# Patient Record
Sex: FEMALE | Race: WHITE | NOT HISPANIC OR LATINO | RURAL
[De-identification: names, ages, dates, MRNs, and addresses within clinical notes are randomized per-mention and may not be internally consistent; named-entity substitution may affect disease eponyms.]

---

## 2019-05-29 ENCOUNTER — OFFICE VISIT (OUTPATIENT)
Dept: RETAIL CLINIC | Facility: CLINIC | Age: 49
End: 2019-05-29

## 2019-05-29 VITALS
SYSTOLIC BLOOD PRESSURE: 120 MMHG | WEIGHT: 211 LBS | RESPIRATION RATE: 12 BRPM | TEMPERATURE: 98.4 F | OXYGEN SATURATION: 98 % | BODY MASS INDEX: 33.91 KG/M2 | HEIGHT: 66 IN | HEART RATE: 65 BPM | DIASTOLIC BLOOD PRESSURE: 82 MMHG

## 2019-05-29 DIAGNOSIS — J04.0 LARYNGITIS: Primary | ICD-10-CM

## 2019-05-29 DIAGNOSIS — J06.9 VIRAL UPPER RESPIRATORY TRACT INFECTION: ICD-10-CM

## 2019-05-29 PROCEDURE — 99203 OFFICE O/P NEW LOW 30 MIN: CPT | Performed by: NURSE PRACTITIONER

## 2019-05-29 RX ORDER — DEXTROMETHORPHAN HYDROBROMIDE AND PROMETHAZINE HYDROCHLORIDE 15; 6.25 MG/5ML; MG/5ML
5 SYRUP ORAL 4 TIMES DAILY PRN
Qty: 120 ML | Refills: 0 | Status: SHIPPED | OUTPATIENT
Start: 2019-05-29 | End: 2019-06-03

## 2019-05-29 RX ORDER — PREDNISONE 10 MG/1
TABLET ORAL
Qty: 21 TABLET | Refills: 0 | Status: SHIPPED | OUTPATIENT
Start: 2019-05-29 | End: 2019-12-01

## 2019-05-29 NOTE — PROGRESS NOTES
"Ingrid Shah is a 48 y.o. female.   /82   Pulse 65   Temp 98.4 °F (36.9 °C)   Resp 12   Ht 167.6 cm (66\")   Wt 95.7 kg (211 lb)   LMP 04/25/2019   SpO2 98%   BMI 34.06 kg/m²   No past medical history on file.  No Known Allergies      Sore Throat    This is a new problem. Episode onset: 2-3 days. The problem has been gradually worsening. There has been no fever. The pain is moderate. Associated symptoms include congestion, coughing and ear pain (pressure). She has had no exposure to strep.        The following portions of the patient's history were reviewed and updated as appropriate: allergies, current medications, past family history, past medical history, past social history, past surgical history and problem list.    Review of Systems   Constitutional: Positive for fatigue (mild). Negative for activity change and fever.   HENT: Positive for congestion, ear pain (pressure), postnasal drip, rhinorrhea and sore throat. Negative for sinus pain.    Eyes: Negative.    Respiratory: Positive for cough and wheezing.    Cardiovascular: Negative.    Gastrointestinal: Negative.        Objective   Physical Exam   Constitutional: She appears well-developed and well-nourished.  Non-toxic appearance. She appears ill (mild).   HENT:   Head: Normocephalic and atraumatic.   Right Ear: Tympanic membrane and ear canal normal.   Left Ear: Tympanic membrane and ear canal normal.   Nose: Mucosal edema and rhinorrhea present. Right sinus exhibits no maxillary sinus tenderness and no frontal sinus tenderness. Left sinus exhibits no maxillary sinus tenderness and no frontal sinus tenderness.   Mouth/Throat: Uvula is midline. Posterior oropharyngeal erythema (mild) present. No tonsillar exudate.   Cardiovascular: Regular rhythm and normal heart sounds.   Pulmonary/Chest: Effort normal. She has no wheezes. She has rhonchi (mild). She has no rales.   Lymphadenopathy:     She has no cervical adenopathy. "   Skin: Skin is warm and dry.       Assessment/Plan   Bety was seen today for sinusitis.    Diagnoses and all orders for this visit:    Laryngitis    Viral upper respiratory tract infection    Other orders  -     predniSONE (DELTASONE) 10 MG tablet; 6/5/4/3/2/1 As directed PO  -     albuterol (PROAIR RESPICLICK) 108 (90 Base) MCG/ACT inhaler; Inhale 2 puffs Every 4 (Four) Hours As Needed for Wheezing or Shortness of Air for up to 30 days.

## 2019-12-01 ENCOUNTER — OFFICE VISIT (OUTPATIENT)
Dept: RETAIL CLINIC | Facility: CLINIC | Age: 49
End: 2019-12-01

## 2019-12-01 VITALS
DIASTOLIC BLOOD PRESSURE: 78 MMHG | TEMPERATURE: 98.7 F | WEIGHT: 202 LBS | OXYGEN SATURATION: 97 % | HEIGHT: 60 IN | RESPIRATION RATE: 20 BRPM | BODY MASS INDEX: 39.66 KG/M2 | HEART RATE: 74 BPM | SYSTOLIC BLOOD PRESSURE: 126 MMHG

## 2019-12-01 DIAGNOSIS — R05.9 COUGHING: Primary | ICD-10-CM

## 2019-12-01 DIAGNOSIS — R09.81 SINUS CONGESTION: ICD-10-CM

## 2019-12-01 PROCEDURE — 99213 OFFICE O/P EST LOW 20 MIN: CPT | Performed by: NURSE PRACTITIONER

## 2019-12-01 RX ORDER — PSEUDOEPHEDRINE HCL 120 MG/1
120 TABLET, FILM COATED, EXTENDED RELEASE ORAL EVERY 12 HOURS
Qty: 20 TABLET | Refills: 0 | Status: SHIPPED | OUTPATIENT
Start: 2019-12-01 | End: 2019-12-11

## 2019-12-01 RX ORDER — DEXTROMETHORPHAN HYDROBROMIDE AND PROMETHAZINE HYDROCHLORIDE 15; 6.25 MG/5ML; MG/5ML
5 SYRUP ORAL 4 TIMES DAILY PRN
Qty: 240 ML | Refills: 0 | Status: SHIPPED | OUTPATIENT
Start: 2019-12-01 | End: 2019-12-11

## 2019-12-01 RX ORDER — ALBUTEROL SULFATE 90 UG/1
2 AEROSOL, METERED RESPIRATORY (INHALATION) EVERY 4 HOURS PRN
Refills: 0 | COMMUNITY
Start: 2019-11-30

## 2019-12-01 RX ORDER — PREDNISONE 10 MG/1
TABLET ORAL
Qty: 24 TABLET | Refills: 0 | Status: SHIPPED | OUTPATIENT
Start: 2019-12-01

## 2019-12-01 RX ORDER — BENZONATATE 200 MG/1
200 CAPSULE ORAL 3 TIMES DAILY PRN
Qty: 30 CAPSULE | Refills: 0 | Status: SHIPPED | OUTPATIENT
Start: 2019-12-01 | End: 2019-12-11

## 2019-12-01 NOTE — PROGRESS NOTES
"Subjective   Bety Shah is a 49 y.o. female.     URI    This is a new problem. The current episode started in the past 7 days. The problem has been gradually worsening. There has been no fever. Associated symptoms include congestion, coughing (severe, persistent, almost constant) and rhinorrhea. Pertinent negatives include no abdominal pain, chest pain, diarrhea, ear pain, headaches, joint pain, joint swelling, nausea, neck pain, plugged ear sensation, rash, sinus pain, sneezing, sore throat, swollen glands, vomiting or wheezing. Treatments tried: otc cough and cold medications  The treatment provided no relief.        The following portions of the patient's history were reviewed and updated as appropriate: allergies, current medications, past medical history, past social history, past surgical history and problem list.    Review of Systems   Constitutional: Positive for fatigue. Negative for appetite change, chills and fever.   HENT: Positive for congestion, postnasal drip and rhinorrhea. Negative for ear pain, sinus pressure, sinus pain, sneezing, sore throat and trouble swallowing.    Eyes: Negative.    Respiratory: Positive for cough (severe, persistent, almost constant) and chest tightness. Negative for shortness of breath and wheezing.    Cardiovascular: Negative.  Negative for chest pain.   Gastrointestinal: Negative for abdominal pain, diarrhea, nausea and vomiting.   Musculoskeletal: Negative for arthralgias, joint pain, myalgias and neck pain.   Skin: Negative.  Negative for rash.   Neurological: Negative.  Negative for headaches.   Hematological: Negative for adenopathy.        /78   Pulse 74   Temp 98.7 °F (37.1 °C)   Resp 20   Ht 152.4 cm (60\")   Wt 91.6 kg (202 lb)   LMP 11/19/2019   SpO2 97%   BMI 39.45 kg/m²      Objective   Physical Exam   Constitutional: She is oriented to person, place, and time. Vital signs are normal. She appears well-developed and well-nourished. No " distress.   HENT:   Head: Normocephalic.   Right Ear: Tympanic membrane, external ear and ear canal normal. No drainage, swelling or tenderness. Tympanic membrane is not erythematous and not bulging.   Left Ear: Tympanic membrane, external ear and ear canal normal. No drainage, swelling or tenderness. Tympanic membrane is not erythematous and not bulging.   Nose: Mucosal edema and rhinorrhea present. Right sinus exhibits no maxillary sinus tenderness and no frontal sinus tenderness. Left sinus exhibits no maxillary sinus tenderness and no frontal sinus tenderness.   Mouth/Throat: Uvula is midline, oropharynx is clear and moist and mucous membranes are normal. Tonsils are 0 on the right. Tonsils are 0 on the left. No tonsillar exudate.   Neck: Normal range of motion. Neck supple.   Cardiovascular: Normal rate, regular rhythm, S1 normal, S2 normal and normal heart sounds.   Pulmonary/Chest: Effort normal and breath sounds normal. No stridor. No respiratory distress. She has no decreased breath sounds. She has no wheezes. She has no rhonchi. She has no rales.   Severe, persistent paroxysmal cough noted during exam.   Abdominal: Soft. Normal appearance and bowel sounds are normal. She exhibits no distension. There is no tenderness. There is no rebound and no guarding.   Lymphadenopathy:        Head (right side): No tonsillar adenopathy present.        Head (left side): No tonsillar adenopathy present.     She has no cervical adenopathy.   Neurological: She is alert and oriented to person, place, and time.   Skin: Skin is warm and dry. No rash noted. She is not diaphoretic.   Psychiatric: She has a normal mood and affect. Her speech is normal and behavior is normal. Thought content normal.   Vitals reviewed.      Assessment/Plan   Bety was seen today for uri.    Diagnoses and all orders for this visit:    Coughing  -     benzonatate (TESSALON) 200 MG capsule; Take 1 capsule by mouth 3 (Three) Times a Day As Needed for  Cough for up to 10 days.  -     promethazine-dextromethorphan (PROMETHAZINE-DM) 6.25-15 MG/5ML syrup; Take 5 mL by mouth 4 (Four) Times a Day As Needed for Cough for up to 10 days.  -     predniSONE (DELTASONE) 10 MG tablet; 6 tabs po x 2 days; 4 tabs po x 2 days; 2 tabs po x 2 days; stop/ tapering dose x 6 days    Sinus congestion  -     pseudoephedrine (SUDAFED) 120 MG 12 hr tablet; Take 1 tablet by mouth Every 12 (Twelve) Hours for 10 days.

## 2022-05-17 NOTE — PATIENT INSTRUCTIONS
Laryngitis  Laryngitis is inflammation of your vocal cords. This causes hoarseness, coughing, loss of voice, sore throat, or a dry throat. Your vocal cords are two bands of muscles that are found in your throat. When you speak, these cords come together and vibrate. These vibrations come out through your mouth as sound. When your vocal cords are inflamed, your voice sounds different.  Laryngitis can be temporary (acute) or long-term (chronic). Most cases of acute laryngitis improve with time. Chronic laryngitis is laryngitis that lasts for more than three weeks.  What are the causes?  Acute laryngitis may be caused by:  · A viral infection.  · Lots of talking, yelling, or singing. This is also called vocal strain.  · Bacterial infections.    Chronic laryngitis may be caused by:  · Vocal strain.  · Injury to your vocal cords.  · Acid reflux (gastroesophageal reflux disease or GERD).  · Allergies.  · Sinus infection.  · Smoking.  · Alcohol abuse.  · Breathing in chemicals or dust.  · Growths on the vocal cords.    What increases the risk?  Risk factors for laryngitis include:  · Smoking.  · Alcohol abuse.  · Having allergies.    What are the signs or symptoms?  Symptoms of laryngitis may include:  · Low, hoarse voice.  · Loss of voice.  · Dry cough.  · Sore throat.  · Stuffy nose.    How is this diagnosed?  Laryngitis may be diagnosed by:  · Physical exam.  · Throat culture.  · Blood test.  · Laryngoscopy. This procedure allows your health care provider to look at your vocal cords with a mirror or viewing tube.    How is this treated?  Treatment for laryngitis depends on what is causing it. Usually, treatment involves resting your voice and using medicines to soothe your throat. However, if your laryngitis is caused by a bacterial infection, you may need to take antibiotic medicine. If your laryngitis is caused by a growth, you may need to have a procedure to remove it.  Follow these instructions at home:  · Drink  enough fluid to keep your urine clear or pale yellow.  · Breathe in moist air. Use a humidifier if you live in a dry climate.  · Take medicines only as directed by your health care provider.  · If you were prescribed an antibiotic medicine, finish it all even if you start to feel better.  · Do not smoke cigarettes or electronic cigarettes. If you need help quitting, ask your health care provider.  · Talk as little as possible. Also avoid whispering, which can cause vocal strain.  · Write instead of talking. Do this until your voice is back to normal.  Contact a health care provider if:  · You have a fever.  · You have increasing pain.  · You have difficulty swallowing.  Get help right away if:  · You cough up blood.  · You have trouble breathing.  This information is not intended to replace advice given to you by your health care provider. Make sure you discuss any questions you have with your health care provider.  Document Released: 12/18/2006 Document Revised: 05/25/2017 Document Reviewed: 06/02/2015  [employer] Interactive Patient Education © 2018 [employer]    Viral Respiratory Infection  A respiratory infection is an illness that affects part of the respiratory system, such as the lungs, nose, or throat. Most respiratory infections are caused by either viruses or bacteria. A respiratory infection that is caused by a virus is called a viral respiratory infection.  Common types of viral respiratory infections include:  · A cold.  · The flu (influenza).  · A respiratory syncytial virus (RSV) infection.    How do I know if I have a viral respiratory infection?  Most viral respiratory infections cause:  · A stuffy or runny nose.  · Yellow or green nasal discharge.  · A cough.  · Sneezing.  · Fatigue.  · Achy muscles.  · A sore throat.  · Sweating or chills.  · A fever.  · A headache.    How are viral respiratory infections treated?  If influenza is diagnosed early, it may be treated with an antiviral medicine that  shortens the length of time a person has symptoms. Symptoms of viral respiratory infections may be treated with over-the-counter and prescription medicines, such as:  · Expectorants. These make it easier to cough up mucus.  · Decongestant nasal sprays.    Health care providers do not prescribe antibiotic medicines for viral infections. This is because antibiotics are designed to kill bacteria. They have no effect on viruses.  How do I know if I should stay home from work or school?  To avoid exposing others to your respiratory infection, stay home if you have:  · A fever.  · A persistent cough.  · A sore throat.  · A runny nose.  · Sneezing.  · Muscles aches.  · Headaches.  · Fatigue.  · Weakness.  · Chills.  · Sweating.  · Nausea.    Follow these instructions at home:  · Rest as much as possible.  · Take over-the-counter and prescription medicines only as told by your health care provider.  · Drink enough fluid to keep your urine clear or pale yellow. This helps prevent dehydration and helps loosen up mucus.  · Gargle with a salt-water mixture 3-4 times per day or as needed. To make a salt-water mixture, completely dissolve ½-1 tsp of salt in 1 cup of warm water.  · Use nose drops made from salt water to ease congestion and soften raw skin around your nose.  · Do not drink alcohol.  · Do not use tobacco products, including cigarettes, chewing tobacco, and e-cigarettes. If you need help quitting, ask your health care provider.  Contact a health care provider if:  · Your symptoms last for 10 days or longer.  · Your symptoms get worse over time.  · You have a fever.  · You have severe sinus pain in your face or forehead.  · The glands in your jaw or neck become very swollen.  Get help right away if:  · You feel pain or pressure in your chest.  · You have shortness of breath.  · You faint or feel like you will faint.  · You have severe and persistent vomiting.  · You feel confused or disoriented.  This information is  not intended to replace advice given to you by your health care provider. Make sure you discuss any questions you have with your health care provider.  Document Released: 09/27/2006 Document Revised: 05/25/2017 Document Reviewed: 05/25/2016  [employer] Interactive Patient Education © 2019 [employer]     Abdomen soft, non-tender, no guarding.

## 2023-07-14 PROBLEM — E66.01 MORBID (SEVERE) OBESITY DUE TO EXCESS CALORIES: Status: ACTIVE | Noted: 2023-07-14

## 2023-07-14 PROBLEM — M17.11 OSTEOARTHRITIS OF RIGHT KNEE: Status: ACTIVE | Noted: 2023-07-14

## 2023-07-14 PROBLEM — I10 PRIMARY HYPERTENSION: Status: ACTIVE | Noted: 2023-07-14

## 2023-07-14 PROBLEM — I47.1 PAROXYSMAL SVT (SUPRAVENTRICULAR TACHYCARDIA): Status: ACTIVE | Noted: 2023-07-14

## 2023-07-14 PROBLEM — R11.2 NAUSEA AND VOMITING: Status: ACTIVE | Noted: 2023-07-14

## 2023-07-14 PROBLEM — R42 DIZZINESS: Status: ACTIVE | Noted: 2023-07-14

## 2023-07-14 PROBLEM — R42 VERTIGO: Status: ACTIVE | Noted: 2023-07-14

## 2023-07-14 PROBLEM — I47.10 PAROXYSMAL SVT (SUPRAVENTRICULAR TACHYCARDIA): Status: ACTIVE | Noted: 2023-07-14

## 2023-09-13 ENCOUNTER — OFFICE VISIT (OUTPATIENT)
Dept: CARDIOLOGY | Facility: CLINIC | Age: 53
End: 2023-09-13
Payer: COMMERCIAL

## 2023-09-13 VITALS
WEIGHT: 228 LBS | OXYGEN SATURATION: 95 % | HEIGHT: 60 IN | BODY MASS INDEX: 44.76 KG/M2 | DIASTOLIC BLOOD PRESSURE: 80 MMHG | SYSTOLIC BLOOD PRESSURE: 110 MMHG | HEART RATE: 61 BPM

## 2023-09-13 DIAGNOSIS — I10 PRIMARY HYPERTENSION: ICD-10-CM

## 2023-09-13 DIAGNOSIS — R06.83 SNORING: ICD-10-CM

## 2023-09-13 DIAGNOSIS — G47.19 EXCESSIVE DAYTIME SLEEPINESS: ICD-10-CM

## 2023-09-13 DIAGNOSIS — I47.1 PAROXYSMAL SVT (SUPRAVENTRICULAR TACHYCARDIA): Primary | ICD-10-CM

## 2023-09-13 RX ORDER — METOPROLOL TARTRATE 50 MG/1
50 TABLET, FILM COATED ORAL 2 TIMES DAILY
Qty: 180 TABLET | Refills: 3 | Status: SHIPPED | OUTPATIENT
Start: 2023-09-13

## 2023-09-13 NOTE — PROGRESS NOTES
[facility] Provider Note     Date:   2023   Name: Bety Shah  :   1970  PCP: José Miguel Henry MD    Chief Complaint   Patient presents with   • Follow-up     1 year SVT       Subjective     History of Present Illness  Bety Shah is a 53 y.o. female who presents today for 1 year follow-up on SVT.  Patient has a previous history of SVT, currently controlled with metoprolol tartrate.  Patient denies recent episodes of SVT or frequent palpitations.  She also denies chest pain, shortness of breath, dizziness, lower extremity edema or syncope.  Overall, patient has been doing well with no recent SVT events.  Patient suspects that she may have sleep apnea. She reports very loud snoring and excessive daytime sleepiness and fatigue.  A sleep study was previously recommended but patient was unable to afford it at that time.  We discussed a home sleep study and patient is willing to proceed for further evaluation and management.    Cardiac history  1.  SVT  2.  Hypertension    [facility] 2020-normal coronary arteries.    Echocardiogram 12/10/2019-LVEF 55-60%.  Mild mitral regurgitation.  Diastolic function is abnormal.  Mild tricuspid regurgitation.  RVSP is normal.     Reports Denies   Chest Pain [] [x]   Shortness of Air [] [x]   Palpitations [] [x]   Edema [] [x]   Dizziness [] [x]   Syncope [] [x]       No Known Allergies    Current Outpatient Medications:   •  albuterol sulfate  (90 Base) MCG/ACT inhaler, Inhale 2 puffs Every 4 (Four) Hours As Needed., Disp: , Rfl: 0  •  diclofenac (VOLTAREN) 75 MG EC tablet, , Disp: , Rfl:   •  Diclofenac Sodium (VOLTAREN) 1 % gel gel, APPLY 4 GRAMS OF GEL TOPICALLY TO AFFECTED AREA UP TO 4 TIMES DAILY, Disp: , Rfl:   •  meclizine (ANTIVERT) 25 MG tablet, Take 1 tablet by mouth 3 (Three) Times a Day As Needed for Dizziness or Nausea., Disp: 30 tablet, Rfl: 0  •  metoprolol tartrate (LOPRESSOR) 50 MG tablet, Take 1 tablet by  "mouth 2 (Two) Times a Day., Disp: 180 tablet, Rfl: 3  •  ondansetron (ZOFRAN) 8 MG tablet, Take 1 tablet by mouth Every 8 (Eight) Hours As Needed for Nausea or Vomiting., Disp: 12 tablet, Rfl: 0    Past Medical History:   Diagnosis Date   • Osteoarthritis of right knee 7/14/2023   • Primary hypertension 7/14/2023      History reviewed. No pertinent surgical history.  Family History   Problem Relation Age of Onset   • Cancer Mother    • Heart disease Father      Social History     Socioeconomic History   • Marital status: Single   Tobacco Use   • Smoking status: Never     Passive exposure: Never   • Smokeless tobacco: Never   Vaping Use   • Vaping Use: Never used   Substance and Sexual Activity   • Alcohol use: Yes     Comment: social   • Drug use: Never   • Sexual activity: Yes     Partners: Male       Objective     Vital Signs:  /80 (BP Location: Left arm)   Pulse 61   Ht 152.4 cm (60\")   Wt 103 kg (228 lb)   SpO2 95%   BMI 44.53 kg/m²   Estimated body mass index is 44.53 kg/m² as calculated from the following:    Height as of this encounter: 152.4 cm (60\").    Weight as of this encounter: 103 kg (228 lb).       Class 3 Severe Obesity (BMI >=40). Obesity-related health conditions include the following: hypertension. Obesity is unchanged. BMI is is above average; BMI management plan is completed. We discussed portion control and increasing exercise.      Physical Exam  Vitals reviewed.   Constitutional:       Appearance: Normal appearance.   HENT:      Head: Normocephalic.   Cardiovascular:      Rate and Rhythm: Normal rate and regular rhythm.      Heart sounds: Normal heart sounds.   Pulmonary:      Effort: Pulmonary effort is normal.      Breath sounds: Normal breath sounds.   Musculoskeletal:      Right lower leg: No edema.      Left lower leg: No edema.   Skin:     General: Skin is warm and dry.      Capillary Refill: Capillary refill takes less than 2 seconds.   Neurological:      General: No focal " deficit present.      Mental Status: She is alert and oriented to person, place, and time.   Psychiatric:         Mood and Affect: Mood normal.         Behavior: Behavior normal.                   Assessment and Plan     Diagnoses and all orders for this visit:    1. Paroxysmal SVT (supraventricular tachycardia) (Primary)  Assessment & Plan:  No recent episodes.  - Continue metoprolol tartrate at current dose.      2. Excessive daytime sleepiness  Assessment & Plan:  Home sleep study for further evaluation and management.    Orders:  -     Home Sleep Study; Future    3. Primary hypertension  Assessment & Plan:  Hypertension is  well controlled. .  Continue current treatment regimen.  Regular aerobic exercise.  Blood pressure will be reassessed at the next regular appointment.      4. Snoring  -     Home Sleep Study; Future    Other orders  -     metoprolol tartrate (LOPRESSOR) 50 MG tablet; Take 1 tablet by mouth 2 (Two) Times a Day.  Dispense: 180 tablet; Refill: 3        Recommendations: Report if any new/changing symptoms immediately and Limit caffeine          Follow Up  Return in about 4 weeks (around 10/11/2023) for Sleep Study Results.  Patient was given instructions and counseling regarding her condition or for health maintenance advice. Please see specific information pulled into the AVS if appropriate.

## 2023-09-13 NOTE — ASSESSMENT & PLAN NOTE
Hypertension is  well controlled. .  Continue current treatment regimen.  Regular aerobic exercise.  Blood pressure will be reassessed at the next regular appointment.

## 2023-09-28 ENCOUNTER — TELEPHONE (OUTPATIENT)
Dept: CARDIOLOGY | Facility: CLINIC | Age: 53
End: 2023-09-28
Payer: COMMERCIAL

## 2023-09-28 NOTE — TELEPHONE ENCOUNTER
Patient does have ERIC with [facility], Dr. Chirinos has let us know. She also has a low heart rate at night. If she has any dizziness during the day we should order a holter. Please let me know if we need to and also what DME for CPAP supplies.

## 2023-09-28 NOTE — TELEPHONE ENCOUNTER
Spoke to patient, patient understood. She stated she wore a heart monitor 2 years ago and they told her everything was fine, she stated she would discuss a holter at next follow up. She would like her CPAP order to be sent to [facility].

## 2023-09-29 DIAGNOSIS — R06.83 SNORING: ICD-10-CM

## 2023-09-29 DIAGNOSIS — G47.33 OSA (OBSTRUCTIVE SLEEP APNEA): Primary | ICD-10-CM

## 2023-09-29 DIAGNOSIS — G47.19 EXCESSIVE DAYTIME SLEEPINESS: ICD-10-CM

## 2023-10-03 DIAGNOSIS — G47.33 OSA (OBSTRUCTIVE SLEEP APNEA): Primary | ICD-10-CM

## 2023-11-06 ENCOUNTER — OFFICE VISIT (OUTPATIENT)
Dept: CARDIOLOGY | Facility: CLINIC | Age: 53
End: 2023-11-06
Payer: COMMERCIAL

## 2023-11-06 VITALS
OXYGEN SATURATION: 98 % | DIASTOLIC BLOOD PRESSURE: 74 MMHG | SYSTOLIC BLOOD PRESSURE: 132 MMHG | HEART RATE: 63 BPM | HEIGHT: 60 IN | BODY MASS INDEX: 44.96 KG/M2 | WEIGHT: 229 LBS

## 2023-11-06 DIAGNOSIS — I10 PRIMARY HYPERTENSION: ICD-10-CM

## 2023-11-06 DIAGNOSIS — G47.33 OSA (OBSTRUCTIVE SLEEP APNEA): ICD-10-CM

## 2023-11-06 DIAGNOSIS — I47.10 PAROXYSMAL SVT (SUPRAVENTRICULAR TACHYCARDIA): Primary | ICD-10-CM

## 2023-11-06 PROCEDURE — 99214 OFFICE O/P EST MOD 30 MIN: CPT | Performed by: NURSE PRACTITIONER

## 2023-11-06 NOTE — PROGRESS NOTES
[facility] Provider Note     Date:   2023   Name: Bety Shah  :   1970  PCP: José Miguel Henry MD    Chief Complaint   Patient presents with    Rapid Heart Rate       Subjective     History of Present Illness  Bety Shah is a 53 y.o. female who presents today for follow-up on SVT and ERIC.  Patient completed a home sleep study on 2023 that revealed moderate ERIC with overall AHI of 24.  Supine AHI was 26.  Her oxygen saturation was below 89% for 25 minutes with oxygen desaturation to a minimum of 72%.  She did have 1 episode of profound bradycardia during sleep.  PAP therapy was ordered but patient has not started it yet.  She went to the DME company and tried on a mask and was unable to tolerate the pressure.  She decided not to try PAP therapy at that time due to fear that she would not be able to tolerate it and have to pay for the device.  I stressed the importance of treating REIC.  Encouraged her to start PAP therapy due to moderate ERIC and desaturations on home sleep study.  She has not had any recent episodes of SVT, currently controlled with metoprolol.  She denies any new concerns or complaints today.    Cardiac history  1.  SVT  2.  Hypertension  3.  ERIC with baseline AHI of 24 on HST from 2023    [facility] 2020-normal coronary arteries.    Echocardiogram 12/10/2019-LVEF 55-60%.  Mild mitral regurgitation.  Diastolic function is abnormal.  Mild tricuspid regurgitation.  RVSP is normal.    Home sleep study 2023-moderate ERIC with overall AHI of 24.      No Known Allergies    Current Outpatient Medications:     albuterol sulfate  (90 Base) MCG/ACT inhaler, Inhale 2 puffs Every 4 (Four) Hours As Needed., Disp: , Rfl: 0    diclofenac (VOLTAREN) 75 MG EC tablet, , Disp: , Rfl:     Diclofenac Sodium (VOLTAREN) 1 % gel gel, APPLY 4 GRAMS OF GEL TOPICALLY TO AFFECTED AREA UP TO 4 TIMES DAILY, Disp: , Rfl:     meclizine (ANTIVERT) 25 MG tablet,  "Take 1 tablet by mouth 3 (Three) Times a Day As Needed for Dizziness or Nausea., Disp: 30 tablet, Rfl: 0    metoprolol tartrate (LOPRESSOR) 50 MG tablet, Take 1 tablet by mouth 2 (Two) Times a Day., Disp: 180 tablet, Rfl: 3    ondansetron (ZOFRAN) 8 MG tablet, Take 1 tablet by mouth Every 8 (Eight) Hours As Needed for Nausea or Vomiting., Disp: 12 tablet, Rfl: 0    Past Medical History:   Diagnosis Date    Osteoarthritis of right knee 7/14/2023    Primary hypertension 7/14/2023      History reviewed. No pertinent surgical history.  Family History   Problem Relation Age of Onset    Cancer Mother     Heart disease Father      Social History     Socioeconomic History    Marital status: Single   Tobacco Use    Smoking status: Never     Passive exposure: Never    Smokeless tobacco: Never   Vaping Use    Vaping Use: Never used   Substance and Sexual Activity    Alcohol use: Yes     Comment: social    Drug use: Never    Sexual activity: Yes     Partners: Male       Objective     Vital Signs:  /74   Pulse 63   Ht 152.4 cm (60\")   Wt 104 kg (229 lb)   SpO2 98%   BMI 44.72 kg/m²   Estimated body mass index is 44.72 kg/m² as calculated from the following:    Height as of this encounter: 152.4 cm (60\").    Weight as of this encounter: 104 kg (229 lb).               Physical Exam  Vitals reviewed.   Constitutional:       Appearance: Normal appearance.   HENT:      Head: Normocephalic.   Cardiovascular:      Rate and Rhythm: Normal rate and regular rhythm.      Heart sounds: Normal heart sounds.   Pulmonary:      Effort: Pulmonary effort is normal.      Breath sounds: Normal breath sounds.   Musculoskeletal:      Right lower leg: No edema.      Left lower leg: No edema.   Skin:     General: Skin is warm and dry.      Capillary Refill: Capillary refill takes less than 2 seconds.   Neurological:      General: No focal deficit present.      Mental Status: She is alert and oriented to person, place, and time. "   Psychiatric:         Mood and Affect: Mood normal.         Behavior: Behavior normal.           Sleep study reviewed          Assessment and Plan     Diagnoses and all orders for this visit:    1. Paroxysmal SVT (supraventricular tachycardia) (Primary)  Assessment & Plan:  No recent episodes.  - Continue metoprolol tartrate at current dose.      2. ERIC (obstructive sleep apnea)  Assessment & Plan:  Patient completed a home sleep study on 9/26/2023 that revealed moderate ERIC with overall AHI of 24.  Supine AHI was 26.  Her oxygen saturation was below 89% for 25 minutes with oxygen desaturation to a minimum of 72%.  She did have 1 episode of profound bradycardia during sleep.  PAP therapy was ordered but patient has not started it yet.  She went to the DME company and tried on a mask and was unable to tolerate the pressure.  She decided not to try PAP therapy at that time due to fear that she would not be able to tolerate it and have to pay for the device.    - Stressed the importance of treating ERIC and encouraged her to try PAP therapy.  We contacted DME company who will reach out to her and set up an appointment.  - Follow-up in 2 months for an ERIC compliance visit.      3. Primary hypertension  Assessment & Plan:  Hypertension is  well controlled. .  Continue current treatment regimen.  Regular aerobic exercise.  Blood pressure will be reassessed at the next regular appointment.          Recommendations: Report if any new/changing symptoms immediately, Sleep risks reviewed (driving, medical, sleep death, sedating agents), and Increase pap therapy usage          Follow Up  Return in about 2 months (around 1/6/2024) for ERIC 31-90 day compliance visit. .  Patient was given instructions and counseling regarding her condition or for health maintenance advice. Please see specific information pulled into the AVS if appropriate.

## 2024-04-22 ENCOUNTER — TELEPHONE (OUTPATIENT)
Dept: FAMILY MEDICINE CLINIC | Facility: CLINIC | Age: 54
End: 2024-04-22
Payer: COMMERCIAL

## 2024-08-23 ENCOUNTER — TELEPHONE (OUTPATIENT)
Dept: FAMILY MEDICINE CLINIC | Facility: CLINIC | Age: 54
End: 2024-08-23
Payer: COMMERCIAL

## 2024-08-23 DIAGNOSIS — R92.8 ABNORMALITY OF LEFT BREAST ON SCREENING MAMMOGRAM: Primary | ICD-10-CM

## 2024-08-23 NOTE — TELEPHONE ENCOUNTER
Phone conversation with patient regarding results of testing from day, 8/23/2024 as follows:    Bilateral screening mammogram as scheduled by patient herself reveals no concerning findings in the right breast, though on the left breast at the 2:00 middle depth there are areas of grouped calcifications with recommendation for further evaluation with magnification views.    Assessment/plan:  1.  Abnormal left breast ultrasound is noted.  Schedule diagnostic left-sided mammogram.  2.  Patient does mention to me that she has had some soreness on the anterior aspect of the left axilla.  Will schedule her for a follow-up visit including simultaneous complete physical on 8/27/2024 at 1:30 PM.

## 2024-08-27 ENCOUNTER — OFFICE VISIT (OUTPATIENT)
Dept: FAMILY MEDICINE CLINIC | Facility: CLINIC | Age: 54
End: 2024-08-27
Payer: COMMERCIAL

## 2024-08-27 VITALS
RESPIRATION RATE: 18 BRPM | TEMPERATURE: 97.8 F | HEIGHT: 60 IN | DIASTOLIC BLOOD PRESSURE: 81 MMHG | HEART RATE: 66 BPM | BODY MASS INDEX: 45.39 KG/M2 | SYSTOLIC BLOOD PRESSURE: 139 MMHG | WEIGHT: 231.2 LBS | OXYGEN SATURATION: 97 %

## 2024-08-27 DIAGNOSIS — I10 PRIMARY HYPERTENSION: ICD-10-CM

## 2024-08-27 DIAGNOSIS — Z01.818 PREOP EXAMINATION: ICD-10-CM

## 2024-08-27 DIAGNOSIS — Z12.4 SCREENING FOR CERVICAL CANCER: ICD-10-CM

## 2024-08-27 DIAGNOSIS — Z80.0 FAMILY HISTORY OF COLON CANCER IN MOTHER: ICD-10-CM

## 2024-08-27 DIAGNOSIS — L73.2 AXILLARY HIDRADENITIS SUPPURATIVA: ICD-10-CM

## 2024-08-27 DIAGNOSIS — E78.5 DYSLIPIDEMIA: ICD-10-CM

## 2024-08-27 DIAGNOSIS — Z13.1 SCREENING FOR DIABETES MELLITUS: ICD-10-CM

## 2024-08-27 DIAGNOSIS — K21.9 GASTROESOPHAGEAL REFLUX DISEASE, UNSPECIFIED WHETHER ESOPHAGITIS PRESENT: ICD-10-CM

## 2024-08-27 DIAGNOSIS — Z13.29 SCREENING FOR THYROID DISORDER: ICD-10-CM

## 2024-08-27 DIAGNOSIS — I47.10 PAROXYSMAL SVT (SUPRAVENTRICULAR TACHYCARDIA): ICD-10-CM

## 2024-08-27 DIAGNOSIS — E55.9 VITAMIN D DEFICIENCY: ICD-10-CM

## 2024-08-27 DIAGNOSIS — Z00.01 ENCOUNTER FOR GENERAL ADULT MEDICAL EXAMINATION WITH ABNORMAL FINDINGS: Primary | ICD-10-CM

## 2024-08-27 DIAGNOSIS — G47.33 OSA (OBSTRUCTIVE SLEEP APNEA): ICD-10-CM

## 2024-08-27 DIAGNOSIS — E66.01 MORBID (SEVERE) OBESITY DUE TO EXCESS CALORIES: ICD-10-CM

## 2024-08-27 DIAGNOSIS — Z12.11 SCREEN FOR COLON CANCER: ICD-10-CM

## 2024-08-27 DIAGNOSIS — M17.11 PRIMARY OSTEOARTHRITIS OF RIGHT KNEE: ICD-10-CM

## 2024-08-27 DIAGNOSIS — Z11.59 NEED FOR HEPATITIS C SCREENING TEST: ICD-10-CM

## 2024-08-27 PROCEDURE — 93000 ELECTROCARDIOGRAM COMPLETE: CPT | Performed by: INTERNAL MEDICINE

## 2024-08-27 PROCEDURE — 99396 PREV VISIT EST AGE 40-64: CPT | Performed by: INTERNAL MEDICINE

## 2024-08-27 PROCEDURE — 99214 OFFICE O/P EST MOD 30 MIN: CPT | Performed by: INTERNAL MEDICINE

## 2024-08-27 RX ORDER — DOXYCYCLINE 100 MG/1
100 CAPSULE ORAL 2 TIMES DAILY
Qty: 30 CAPSULE | Refills: 0 | Status: SHIPPED | OUTPATIENT
Start: 2024-08-27 | End: 2024-08-27

## 2024-08-27 RX ORDER — PANTOPRAZOLE SODIUM 40 MG/1
40 TABLET, DELAYED RELEASE ORAL DAILY
Qty: 90 TABLET | Refills: 3 | Status: SHIPPED | OUTPATIENT
Start: 2024-08-27

## 2024-08-27 RX ORDER — PHENTERMINE HYDROCHLORIDE 15 MG/1
15 CAPSULE ORAL EVERY MORNING
Qty: 30 CAPSULE | Refills: 0 | Status: SHIPPED | OUTPATIENT
Start: 2024-08-27

## 2024-08-27 RX ORDER — DOXYCYCLINE 100 MG/1
100 CAPSULE ORAL 2 TIMES DAILY
Qty: 30 CAPSULE | Refills: 0 | Status: SHIPPED | OUTPATIENT
Start: 2024-08-27

## 2024-08-27 NOTE — ASSESSMENT & PLAN NOTE
54-year-old female presenting for complete physical with health issues being addressed as detailed below, EKG today stable, referring for update of Pap smear, mammogram bilateral screening on 8/23/2024 with abnormality on the left with spot compression views pending, recommended update Shingrix COVID-19 and flu vaccines outside the office, update pertinent screening labs.

## 2024-08-27 NOTE — ASSESSMENT & PLAN NOTE
Currently not on any cholesterol-lowering medication.  Update lipid profile make further recommendation accordingly.

## 2024-08-27 NOTE — ASSESSMENT & PLAN NOTE
Patient to undergo colonoscopy with conscious sedation, this appointment to be scheduled.  EKG today unremarkable, obtain pertinent screening labs.

## 2024-08-27 NOTE — ASSESSMENT & PLAN NOTE
Discontinue prescribed Voltaren tablets given dyspepsia and GERD.  Use topical Voltaren gel and/or Tylenol.

## 2024-08-27 NOTE — ASSESSMENT & PLAN NOTE
Longstanding problem, recently has made some concerted efforts regarding lifestyle change including diet and exercise.  We discussed this needs to be more of a long-term gym effort, discussing recommendation to pursue the Mediterranean diet.  Unfortunately it appears that GLP-1 agonist, which she would be an optimal candidate, is not covered or cost effective by her insurance at this time.  Discussing treatment options, will initiate trial of phentermine 15 mg daily monitoring for side effects, especially in conjunction with history of hypertension and prior PSVT.  Advised of any related concerns.  Reassess clinically in 1 month.

## 2024-08-27 NOTE — PROGRESS NOTES
Female Physical Note      Date: 2024   Patient Name: Bety Shah  : 1970   MRN: [id number]     Chief Complaint:    Chief Complaint   Patient presents with    Annual Exam       History of Present Illness: Bety Shah is a 54 y.o. female who is here today for their annual health maintenance and physical.  Patient's main concern relates to a fullness with some tenderness recently in both axillary regions anteriorly.  She did have an abnormal left-sided mammogram recently with microcalcifications in the upper outer quadrant with recommended spot compression views to be obtained, patient wondering if this area could be the cause of her symptoms.  Occasionally has a stiff neck.  History of PSVT last noted 2023, previous left heart cath normal in 2020, echocardiogram in 10/2019 with EF 55 to 60%, mild MR mild TR, currently maintained on metoprolol 50 mg twice daily for both arrhythmia suppression of hypertension, doing well, blood pressures averaging 120-130 over 80s, followed by cardiology most recently in 2023 at the time with a 2-month follow-up recommended not yet pursued.  History of ERIC with AHI 24, intolerant of initial trial of CPAP, not yet trying any further hardware.  Complains of bilateral knee osteoarthritis, more so on the right, getting good control with Voltaren orally, rarely using topical Voltaren, also obesity having made some dietary changes with decreasing fast food and calorie restriction with increased fruit and vegetable intake, exercising 45 minutes twice weekly over the last month, frustrated with lack of benefit.  Patient also complains of water 2 times daily dyspepsia with heartburn taking Tums.  Review of systems otherwise unremarkable, no chest pains palpitations dizziness or edema, no shortness of breath cough or wheeze, dyspepsia otherwise no GI concerns, no  concerns, stiff neck and bilateral knee OA otherwise no related concerns.  Moods are  doing well.  Health maintenance includes need for initial colon cancer screening with family history of colon cancer in her mother, past due for Pap smear requesting gynecology referral, recent mammogram screen from 8/23/2024 with abnormal left-sided mammogram with spot compression views ordered and pending, recommended update Shingrix COVID-19 and flu vaccines, also update screening labs, EKG today unremarkable      Subjective      Review of Systems:   Review of Systems    Past Medical History, Social History, Family History and Care Team were all reviewed with patient and updated as appropriate.     Medications:     Current Outpatient Medications:     albuterol sulfate  (90 Base) MCG/ACT inhaler, Inhale 2 puffs Every 4 (Four) Hours As Needed., Disp: , Rfl: 0    diclofenac (VOLTAREN) 75 MG EC tablet, , Disp: , Rfl:     Diclofenac Sodium (VOLTAREN) 1 % gel gel, APPLY 4 GRAMS OF GEL TOPICALLY TO AFFECTED AREA UP TO 4 TIMES DAILY, Disp: , Rfl:     doxycycline (VIBRAMYCIN) 100 MG capsule, Take 1 capsule by mouth 2 (Two) Times a Day., Disp: 30 capsule, Rfl: 0    meclizine (ANTIVERT) 25 MG tablet, Take 1 tablet by mouth 3 (Three) Times a Day As Needed for Dizziness or Nausea., Disp: 30 tablet, Rfl: 0    metoprolol tartrate (LOPRESSOR) 50 MG tablet, Take 1 tablet by mouth 2 (Two) Times a Day., Disp: 180 tablet, Rfl: 3    ondansetron (ZOFRAN) 8 MG tablet, Take 1 tablet by mouth Every 8 (Eight) Hours As Needed for Nausea or Vomiting., Disp: 12 tablet, Rfl: 0    pantoprazole (Protonix) 40 MG EC tablet, Take 1 tablet by mouth Daily., Disp: 90 tablet, Rfl: 3    phentermine 15 MG capsule, Take 1 capsule by mouth Every Morning., Disp: 30 capsule, Rfl: 0    Allergies:   No Known Allergies    Immunizations:  Health Maintenance Summary            Overdue - COLORECTAL CANCER SCREENING (View Topic Details) Order placed this encounter      No completion, postpone, or frequency change history exists for this topic.               Ordered - HEPATITIS C SCREENING (Once) Ordered on 8/27/2024      No completion, postpone, or frequency change history exists for this topic.              Overdue - PAP SMEAR (Every 3 Years) Never done      No completion, postpone, or frequency change history exists for this topic.              Overdue - ZOSTER VACCINE (1 of 2) Never done      No completion, postpone, or frequency change history exists for this topic.              INFLUENZA VACCINE (Yearly - August to March) Due since 8/1/2024      10/25/2017  Imm Admin: Fluzone (or Fluarix & Flulaval for VFC) >6mos    08/27/2016  Imm Admin: Fluzone  >6mos              Postponed - COVID-19 Vaccine (1 - 2023-24 season) Postponed until 12/31/2024 08/27/2024  Postponed until 12/31/2024 by Jonna Franks (Patient Refused - refused)              ANNUAL PHYSICAL (Yearly) Next due on 8/27/2025 08/27/2024  Done              BMI FOLLOWUP (Yearly) Next due on 8/27/2025 08/27/2024  Registry Metric: BMI Follow-up    09/13/2023  Smart[employer]: WORKFLOW - QUALITY MEASUREMENT - BMI FOLLOW UP CARE PLAN DOCUMENTED    09/13/2023  Smart[employer]: WORKFLOW - QUALITY MEASUREMENT - DOCUMENTED WEIGHT FOLLOW-UP PLAN    07/14/2023  Smart[employer]: BMI EDUCATION FOR OVERWEIGHT              TDAP/TD VACCINES (2 - Td or Tdap) Next due on 4/22/2026 04/22/2016  Imm Admin: Tdap              Ordered - MAMMOGRAM (Every 2 Years) Ordered on 8/23/2024 08/23/2024  MAMMO Scan              Pneumococcal Vaccine 0-64 (Series Information) Aged Out      No completion, postpone, or frequency change history exists for this topic.                     No orders of the defined types were placed in this encounter.       Colorectal Screening:   Referral pending for colonoscopy  Last Completed Colonoscopy       This patient has no relevant Health Maintenance data.          Pap: Gynecology referral pending  Last Completed Pap Smear       This patient has no relevant Health Maintenance data.          "  Mammogram: Abnormal left mammogram 8/23/2024 referral pending for spot compression views, right mammogram screen normal  Last Completed Mammogram            Ordered - MAMMOGRAM (Every 2 Years) Ordered on 8/23/2024 08/23/2024  MAMMO Scan                     CT for Smoker (Age 50-80, 20 pk yr):   N/A  Bone Density/DEXA (Age 65 or high risk): N/A  Hep C (Age 18-79 once): Pending  HIV (Age 15-65 once): No results found for: \"HIV1X2\" N/A  A1c: No results found for: \"HGBA1C\" pending  Lipid panel:  No results found for: \"LIPIDEXCLUSI\" pending    The ASCVD Risk score (Inge YU, et al., 2019) failed to calculate for the following reasons:    Cannot find a previous HDL lab    Cannot find a previous total cholesterol lab    Dermatology: N/A  Ophthalmologist: Regular checkups encouraged  Dentist: Regular checkups encouraged    Tobacco Use: Low Risk  (8/27/2024)    Patient History     Smoking Tobacco Use: Never     Smokeless Tobacco Use: Never     Passive Exposure: Never       Social History     Substance and Sexual Activity   Alcohol Use Yes    Comment: social        Social History     Substance and Sexual Activity   Drug Use Never        Diet/Physical activity: Chronically suboptimal diet and physical activity, acutely has made some improvements    Sexual Health: Does not require contraception, not attempting pregnancy   Menopause: Yes  Menstrual Cycles: None, last menstrual cycle: Several years prior    Depression: PHQ-2 Depression Screening  PHQ-9 Total Score: 0       Objective     Physical Exam:  Vital Signs:   Vitals:    08/27/24 1326   BP: 139/81   BP Location: Left arm   Patient Position: Sitting   Cuff Size: Adult   Pulse: 66   Resp: 18   Temp: 97.8 °F (36.6 °C)   TempSrc: Temporal   SpO2: 97%   Weight: 105 kg (231 lb 3.2 oz)   Height: 152.4 cm (60\")     Facility age limit for growth %laila is 20 years.  Body mass index is 45.15 kg/m².     Physical Exam  Vitals and nursing note reviewed.   Constitutional:       " General: She is not in acute distress.     Appearance: Normal appearance. She is obese. She is not ill-appearing.      Comments: Pleasant healthy alert and oriented, NAD, BMI 45.1   HENT:      Head: Normocephalic and atraumatic.      Right Ear: Tympanic membrane, ear canal and external ear normal.      Left Ear: Tympanic membrane, ear canal and external ear normal.      Nose: Nose normal. No congestion or rhinorrhea.      Mouth/Throat:      Mouth: Mucous membranes are moist.      Pharynx: Oropharynx is clear.      Comments: Good dentition  Eyes:      Extraocular Movements: Extraocular movements intact.      Conjunctiva/sclera: Conjunctivae normal.      Pupils: Pupils are equal, round, and reactive to light.   Neck:      Vascular: No carotid bruit.      Comments: No periclavicular or axillary or inguinal adenopathy  Cardiovascular:      Rate and Rhythm: Normal rate and regular rhythm.      Pulses: Normal pulses.      Heart sounds: Normal heart sounds. No murmur heard.     No friction rub. No gallop.      Comments: 2+ carotids without bruits, 2+ radial pulses, 2+ femoral pulses without bruits, 2+ bipedal pulses with good perfusion and no dependent edema  Pulmonary:      Effort: Pulmonary effort is normal. No respiratory distress.      Breath sounds: Normal breath sounds.      Comments: No cough  Chest:   Breasts:     Right: Normal.      Comments: Multiple bilateral anterior axillary subcutaneous nodules palpated with tenderness, overlying erythema, no obvious fluctuance, no open wounds, most consistent with mild hidradenitis suppurativa  Abdominal:      General: Bowel sounds are normal. There is no distension.      Palpations: Abdomen is soft. There is no mass.      Tenderness: There is no abdominal tenderness. There is no guarding or rebound.      Hernia: No hernia is present.      Comments: Nontender nondistended with no organomegaly or masses   Genitourinary:     Comments: Breast and pelvic exams deferred today  given lack of acute concerns with gynecology referral pending  Musculoskeletal:         General: Tenderness present. No swelling, deformity or signs of injury. Normal range of motion.      Cervical back: Normal range of motion and neck supple. No rigidity or tenderness.      Right lower leg: No edema.      Left lower leg: No edema.      Comments: Mild discomfort to palpation of her right knee, ROM generally intact.  No effusion.   Lymphadenopathy:      Cervical: No cervical adenopathy.   Skin:     General: Skin is warm and dry.      Capillary Refill: Capillary refill takes less than 2 seconds.      Findings: Erythema, lesion and rash present.      Comments: Multiple bilateral anterior axillary subcutaneous nodules palpated with tenderness, overlying erythema, no obvious fluctuance, no open wounds, most consistent with mild hidradenitis suppurativa   Neurological:      General: No focal deficit present.      Mental Status: She is alert and oriented to person, place, and time. Mental status is at baseline.      Cranial Nerves: No cranial nerve deficit.      Sensory: No sensory deficit.      Motor: No weakness.      Coordination: Coordination normal.      Gait: Gait normal.   Psychiatric:         Mood and Affect: Mood normal.         Behavior: Behavior normal.         Thought Content: Thought content normal.         Judgment: Judgment normal.         POCT Results (if applicable);   No results found for this or any previous visit.       ECG 12 Lead    Date/Time: 8/27/2024 7:08 PM  Performed by: Adryan Henry MD    Authorized by: Adryan Henry MD  Comparison: compared with previous ECG from 7/14/2023  Similar to previous ECG  Comparison to previous ECG: Normal sinus rhythm rate 67, inferior T wave changes unchanged versus prior tracing, no acute ischemia noted          Assessment / Plan      Assessment/Plan:   Diagnoses and all orders for this visit:    1. Encounter for general adult medical examination with  abnormal findings (Primary)  Assessment & Plan:  54-year-old female presenting for complete physical with health issues being addressed as detailed below, EKG today stable, referring for update of Pap smear, mammogram bilateral screening on 8/23/2024 with abnormality on the left with spot compression views pending, recommended update Shingrix COVID-19 and flu vaccines outside the office, update pertinent screening labs.    Orders:  -     Discontinue: doxycycline (VIBRAMYCIN) 100 MG capsule; Take 1 capsule by mouth 2 (Two) Times a Day.  Dispense: 30 capsule; Refill: 0  -     phentermine 15 MG capsule; Take 1 capsule by mouth Every Morning.  Dispense: 30 capsule; Refill: 0  -     pantoprazole (Protonix) 40 MG EC tablet; Take 1 tablet by mouth Daily.  Dispense: 90 tablet; Refill: 3  -     TSH Rfx On Abnormal To Free T4; Future  -     Comprehensive Metabolic Panel; Future  -     CBC & Differential; Future  -     Lipid Panel; Future  -     Hepatitis C Antibody; Future  -     Hemoglobin A1c; Future  -     Vitamin D,25-Hydroxy; Future  -     Urinalysis With Culture If Indicated -; Future  -     Ambulatory Referral For Screening Colonoscopy  -     Ambulatory Referral to Gynecology  -     doxycycline (VIBRAMYCIN) 100 MG capsule; Take 1 capsule by mouth 2 (Two) Times a Day.  Dispense: 30 capsule; Refill: 0    2. Preop examination  Assessment & Plan:  Patient to undergo colonoscopy with conscious sedation, this appointment to be scheduled.  EKG today unremarkable, obtain pertinent screening labs.    Orders:  -     Comprehensive Metabolic Panel; Future  -     CBC & Differential; Future    3. Paroxysmal SVT (supraventricular tachycardia)  Assessment & Plan:  Currently suppressed with metoprolol 50 mg twice daily.  EKG today unremarkable.    Orders:  -     ECG 12 Lead    4. Primary hypertension  Assessment & Plan:  Satisfactory blood pressure control acutely as well as by history chronically taking metoprolol 50 mg twice daily,  this choice given for concomitant suppression of previously documented PSVT.  Continue current regimen with monitoring both blood pressures and pulse rates, ideal parameters discussed.  EKG today unremarkable.    Orders:  -     ECG 12 Lead  -     Comprehensive Metabolic Panel; Future  -     Urinalysis With Culture If Indicated -; Future    5. Dyslipidemia  Assessment & Plan:  Currently not on any cholesterol-lowering medication.  Update lipid profile make further recommendation accordingly.    Orders:  -     Lipid Panel; Future    6. Vitamin D deficiency  -     Vitamin D,25-Hydroxy; Future    7. Morbid (severe) obesity due to excess calories  Assessment & Plan:  Longstanding problem, recently has made some concerted efforts regarding lifestyle change including diet and exercise.  We discussed this needs to be more of a long-term gym effort, discussing recommendation to pursue the Mediterranean diet.  Unfortunately it appears that GLP-1 agonist, which she would be an optimal candidate, is not covered or cost effective by her insurance at this time.  Discussing treatment options, will initiate trial of phentermine 15 mg daily monitoring for side effects, especially in conjunction with history of hypertension and prior PSVT.  Advised of any related concerns.  Reassess clinically in 1 month.    Orders:  -     phentermine 15 MG capsule; Take 1 capsule by mouth Every Morning.  Dispense: 30 capsule; Refill: 0    8. ERIC (obstructive sleep apnea)  Assessment & Plan:  Moderate ERIC with documented AHI of 24.  Patient indicates was unable to tolerate CPAP.  I have advised her to discuss further options with the office of Dr. Yessica Johnson of cardiology/sleep medicine, discussing adverse health risks of untreated ERIC.      9. Axillary hidradenitis suppurativa  Assessment & Plan:  Exam consistent with bilateral axillary hidradenitis suppurativa.  Prescribe doxycycline for the next couple weeks and assess response accordingly.  She  may require longer doxycycline management.    Orders:  -     Discontinue: doxycycline (VIBRAMYCIN) 100 MG capsule; Take 1 capsule by mouth 2 (Two) Times a Day.  Dispense: 30 capsule; Refill: 0  -     doxycycline (VIBRAMYCIN) 100 MG capsule; Take 1 capsule by mouth 2 (Two) Times a Day.  Dispense: 30 capsule; Refill: 0    10. Gastroesophageal reflux disease, unspecified whether esophagitis present  Assessment & Plan:  Describes multiple daily episodes of dyspepsia/GERD.  Discontinue Voltaren prescribed for her bilateral knee osteoarthritis, and initiate pantoprazole 40 mg daily for minimum of 2 months then taper slowly off as able to maintain control of symptoms.  Treat acute symptoms with Tums or related antacid.    Orders:  -     pantoprazole (Protonix) 40 MG EC tablet; Take 1 tablet by mouth Daily.  Dispense: 90 tablet; Refill: 3    11. Family history of colon cancer in mother  Assessment & Plan:  Refer for initial screening colonoscopy.      12. Screen for colon cancer  Assessment & Plan:  Refer for initial screening colonoscopy, noting family history of colon cancer in her mother.    Orders:  -     Ambulatory Referral For Screening Colonoscopy    13. Screening for thyroid disorder  -     TSH Rfx On Abnormal To Free T4; Future    14. Screening for diabetes mellitus  -     Hemoglobin A1c; Future    15. Screening for cervical cancer  Assessment & Plan:  Reported last Pap smear remote.  Referred to gynecology for update.    Orders:  -     Ambulatory Referral to Gynecology    16. Need for hepatitis C screening test  -     Hepatitis C Antibody; Future    17. Primary osteoarthritis of right knee  Assessment & Plan:  Discontinue prescribed Voltaren tablets given dyspepsia and GERD.  Use topical Voltaren gel and/or Tylenol.      Follow-up in 1 month to assess clinical response to phentermine therapy.    Healthcare Maintenance:  Counseling provided based on age appropriate USPSTF guidelines.       Bety Shah  voices understanding and acceptance of this advice and will call back with any further questions or concerns. AVS with preventive healthcare tips printed for patient.       Follow Up:   Return in about 1 month (around 9/27/2024) for Recheck.      At [facility], we believe that sharing information builds trust and better relationships. You are receiving this note because you recently visited [facility]. It is possible you will see health information before a provider has talked with you about it. This kind of information can be easy to misunderstand. To help you fully understand what it means for your health, we urge you to discuss this note with your provider.    Adryan Henry MD  [facility]

## 2024-08-27 NOTE — ASSESSMENT & PLAN NOTE
Exam consistent with bilateral axillary hidradenitis suppurativa.  Prescribe doxycycline for the next couple weeks and assess response accordingly.  She may require longer doxycycline management.

## 2024-08-27 NOTE — ASSESSMENT & PLAN NOTE
Describes multiple daily episodes of dyspepsia/GERD.  Discontinue Voltaren prescribed for her bilateral knee osteoarthritis, and initiate pantoprazole 40 mg daily for minimum of 2 months then taper slowly off as able to maintain control of symptoms.  Treat acute symptoms with Tums or related antacid.

## 2024-08-27 NOTE — ASSESSMENT & PLAN NOTE
Moderate ERIC with documented AHI of 24.  Patient indicates was unable to tolerate CPAP.  I have advised her to discuss further options with the office of Dr. Yessica Johnson of cardiology/sleep medicine, discussing adverse health risks of untreated ERIC.

## 2024-08-27 NOTE — ASSESSMENT & PLAN NOTE
Satisfactory blood pressure control acutely as well as by history chronically taking metoprolol 50 mg twice daily, this choice given for concomitant suppression of previously documented PSVT.  Continue current regimen with monitoring both blood pressures and pulse rates, ideal parameters discussed.  EKG today unremarkable.

## 2024-09-16 ENCOUNTER — TELEPHONE (OUTPATIENT)
Dept: FAMILY MEDICINE CLINIC | Facility: CLINIC | Age: 54
End: 2024-09-16
Payer: COMMERCIAL

## 2024-09-16 DIAGNOSIS — R92.8 ABNORMALITY OF LEFT BREAST ON SCREENING MAMMOGRAM: ICD-10-CM

## 2024-09-16 DIAGNOSIS — R92.8 ABNORMALITY OF LEFT BREAST ON SCREENING MAMMOGRAM: Primary | ICD-10-CM

## 2024-10-02 ENCOUNTER — TELEPHONE (OUTPATIENT)
Dept: FAMILY MEDICINE CLINIC | Facility: CLINIC | Age: 54
End: 2024-10-02
Payer: COMMERCIAL

## 2024-10-02 NOTE — TELEPHONE ENCOUNTER
Patient contacted the office recently indicating that she does not want to pursue consultation with Dr. Sarabia of general surgery nor obtain recommended stereotactic biopsy of her left breast given the cost, having had diagnostic mammogram revealing a concerning lesion with radiology recommendation for biopsy.  She indicated to the office that she wishes to follow-up with another mammogram.  I was unsuccessful contacting patient leaving a message regarding my desire to have this discussion.  I will call her back in the near future.

## 2024-10-04 ENCOUNTER — TELEPHONE (OUTPATIENT)
Dept: FAMILY MEDICINE CLINIC | Facility: CLINIC | Age: 54
End: 2024-10-04
Payer: COMMERCIAL

## 2024-10-04 DIAGNOSIS — R92.8 ABNORMALITY OF LEFT BREAST ON SCREENING MAMMOGRAM: Primary | ICD-10-CM

## 2024-10-04 NOTE — TELEPHONE ENCOUNTER
Phone conversation with patient after she notified our office that she did not want to proceed with recommended office consultation with Dr. Amado Sarabia or recommended stereotactic left breast biopsy recommended after she had note an abnormal diagnostic left breast mammogram following an abnormal screen on 8/23/2024 followed by an abnormal left breast diagnostic study on 9/12/2024.  I contacted her regarding her position, patient noting that she simply cannot afford at this time to proceed with any other testing.  She understands after our conversation that she could potentially have a breast cancer that could potentially spread if she delays potential diagnosis with biopsy, and she is willing to accept this risk.  We agreed that she will repeat a left-sided diagnostic mammogram in early January 2025, and make further recommendations at that time

## 2024-10-06 DIAGNOSIS — E66.01 MORBID (SEVERE) OBESITY DUE TO EXCESS CALORIES: ICD-10-CM

## 2024-10-06 DIAGNOSIS — Z00.01 ENCOUNTER FOR GENERAL ADULT MEDICAL EXAMINATION WITH ABNORMAL FINDINGS: ICD-10-CM

## 2024-10-07 RX ORDER — PHENTERMINE HYDROCHLORIDE 15 MG/1
15 CAPSULE ORAL EVERY MORNING
Qty: 30 CAPSULE | Refills: 0 | OUTPATIENT
Start: 2024-10-07

## 2024-10-21 RX ORDER — METOPROLOL TARTRATE 50 MG
50 TABLET ORAL 2 TIMES DAILY
Qty: 30 TABLET | Refills: 0 | Status: SHIPPED | OUTPATIENT
Start: 2024-10-21

## 2024-10-29 ENCOUNTER — TELEPHONE (OUTPATIENT)
Dept: FAMILY MEDICINE CLINIC | Facility: CLINIC | Age: 54
End: 2024-10-29
Payer: COMMERCIAL

## 2024-10-29 NOTE — TELEPHONE ENCOUNTER
Caller: Bety Shah    Relationship: Self    Best call back number: [phone]    What is the best time to reach you: ANYTIME    Who are you requesting to speak with (clinical staff, provider,  specific staff member):   PROVIDER    What was the call regarding: PATIENT STATES THAT SHE WOULD LIKE TO PROCEED WITH THE BIOPSY. PLEASE ADVISE    Is it okay if the provider responds through MyChart: NO

## 2024-10-30 NOTE — TELEPHONE ENCOUNTER
I have tried to call with no answer. TF    I have called again with no answer,. States she is unavailable. TF    I have tried to call with no answer. TF    I have tried to reach her again and have gotten her vm. I have left her a message regarding her refer having been sent and have asked that she call if she has any questions. TF

## 2024-11-04 ENCOUNTER — TELEPHONE (OUTPATIENT)
Dept: FAMILY MEDICINE CLINIC | Facility: CLINIC | Age: 54
End: 2024-11-04
Payer: COMMERCIAL

## 2024-11-04 NOTE — TELEPHONE ENCOUNTER
I have spoke with her regarding her getting a breast biopsy. I have given her the number to Dr. Amado Sarabia. [phone].   She is going to call and set up her an appt. TF

## 2024-12-02 ENCOUNTER — OFFICE VISIT (OUTPATIENT)
Dept: CARDIOLOGY | Facility: CLINIC | Age: 54
End: 2024-12-02
Payer: COMMERCIAL

## 2024-12-02 VITALS
SYSTOLIC BLOOD PRESSURE: 106 MMHG | RESPIRATION RATE: 18 BRPM | DIASTOLIC BLOOD PRESSURE: 68 MMHG | BODY MASS INDEX: 44.17 KG/M2 | OXYGEN SATURATION: 97 % | WEIGHT: 225 LBS | HEART RATE: 80 BPM | HEIGHT: 60 IN

## 2024-12-02 DIAGNOSIS — G47.33 OSA (OBSTRUCTIVE SLEEP APNEA): ICD-10-CM

## 2024-12-02 DIAGNOSIS — I47.10 PAROXYSMAL SVT (SUPRAVENTRICULAR TACHYCARDIA): Primary | ICD-10-CM

## 2024-12-02 PROCEDURE — 99213 OFFICE O/P EST LOW 20 MIN: CPT | Performed by: NURSE PRACTITIONER

## 2024-12-02 RX ORDER — METOPROLOL TARTRATE 50 MG
50 TABLET ORAL 2 TIMES DAILY
Qty: 180 TABLET | Refills: 3 | Status: SHIPPED | OUTPATIENT
Start: 2024-12-02

## 2024-12-02 NOTE — ASSESSMENT & PLAN NOTE
Currently stable on metoprolol.  EKG from August 2024 was unremarkable.  She denies any current cardiac symptoms.    - Continue metoprolol tartrate 50 mg twice daily

## 2024-12-02 NOTE — ASSESSMENT & PLAN NOTE
History of moderate ERIC with AHI of 24.  She was intolerant to PAP therapy.  She is not willing to retry at this time.  We discussed risk benefits and importance of PAP therapy.  She declines to retry at this time.  -Do not drive if sleepy

## 2024-12-02 NOTE — PROGRESS NOTES
[facility] Provider Note     Date:   2024   Name: Bety Shah  :   1970  PCP: Adryan Henry MD    Chief Complaint   Patient presents with    Follow-up       Subjective     History of Present Illness  Bety Shah is a 54 y.o. female who presents today for follow-up on SVT and ERIC. Patient has a previous history of SVT, currently controlled with metoprolol tartrate.  Patient denies recent episodes of SVT or frequent palpitations.  She also denies chest pain, shortness of breath, dizziness, lower extremity edema or syncope.  Overall, patient has been doing well with no recent SVT events.   She also has a history of moderate ERIC but is intolerant to PAP therapy. She is not willing to retry pap therapy at this time.    Cardiac history  1.  SVT  2.  Hypertension  3.  ERIC with baseline AHI of 24 on HST from 2023    [facility] 2020-normal coronary arteries.    Echocardiogram 12/10/2019-LVEF 55-60%.  Mild mitral regurgitation.  Diastolic function is abnormal.  Mild tricuspid regurgitation.  RVSP is normal.    Home sleep study 2023-moderate ERIC with overall AHI of 24.     Reports Denies   Chest Pain [] [x]   Shortness of Air [] [x]   Palpitations [] [x]   Edema [] [x]   Dizziness [] [x]   Syncope [] [x]       No Known Allergies    Current Outpatient Medications:     albuterol sulfate  (90 Base) MCG/ACT inhaler, Inhale 2 puffs Every 4 (Four) Hours As Needed., Disp: , Rfl: 0    diclofenac (VOLTAREN) 75 MG EC tablet, , Disp: , Rfl:     Diclofenac Sodium (VOLTAREN) 1 % gel gel, APPLY 4 GRAMS OF GEL TOPICALLY TO AFFECTED AREA UP TO 4 TIMES DAILY, Disp: , Rfl:     doxycycline (VIBRAMYCIN) 100 MG capsule, Take 1 capsule by mouth 2 (Two) Times a Day., Disp: 30 capsule, Rfl: 0    meclizine (ANTIVERT) 25 MG tablet, Take 1 tablet by mouth 3 (Three) Times a Day As Needed for Dizziness or Nausea., Disp: 30 tablet, Rfl: 0    metoprolol tartrate (LOPRESSOR) 50 MG  "tablet, Take 1 tablet by mouth 2 (Two) Times a Day., Disp: 180 tablet, Rfl: 3    ondansetron (ZOFRAN) 8 MG tablet, Take 1 tablet by mouth Every 8 (Eight) Hours As Needed for Nausea or Vomiting., Disp: 12 tablet, Rfl: 0    pantoprazole (Protonix) 40 MG EC tablet, Take 1 tablet by mouth Daily., Disp: 90 tablet, Rfl: 3    phentermine 15 MG capsule, Take 1 capsule by mouth Every Morning. (Patient not taking: Reported on 12/2/2024), Disp: 30 capsule, Rfl: 0    Past Medical History:   Diagnosis Date    Gastroesophageal reflux disease 8/27/2024    Osteoarthritis of right knee 7/14/2023    Primary hypertension 7/14/2023      History reviewed. No pertinent surgical history.  Family History   Problem Relation Age of Onset    Cancer Mother     Heart disease Father      Social History     Socioeconomic History    Marital status: Single   Tobacco Use    Smoking status: Never     Passive exposure: Never    Smokeless tobacco: Never   Vaping Use    Vaping status: Never Used   Substance and Sexual Activity    Alcohol use: Yes     Comment: social    Drug use: Never    Sexual activity: Yes     Partners: Male       Objective     Vital Signs:  /68   Pulse 80   Resp 18   Ht 152.4 cm (60\")   Wt 102 kg (225 lb)   SpO2 97%   BMI 43.94 kg/m²   Estimated body mass index is 43.94 kg/m² as calculated from the following:    Height as of this encounter: 152.4 cm (60\").    Weight as of this encounter: 102 kg (225 lb).               Physical Exam  Vitals reviewed.   Constitutional:       Appearance: Normal appearance.   HENT:      Head: Normocephalic.   Cardiovascular:      Rate and Rhythm: Normal rate and regular rhythm.      Heart sounds: Normal heart sounds.   Pulmonary:      Effort: Pulmonary effort is normal.      Breath sounds: Normal breath sounds.   Musculoskeletal:      Right lower leg: No edema.      Left lower leg: No edema.   Skin:     General: Skin is warm and dry.      Capillary Refill: Capillary refill takes less than 2 " seconds.   Neurological:      General: No focal deficit present.      Mental Status: She is alert and oriented to person, place, and time.   Psychiatric:         Mood and Affect: Mood normal.         Behavior: Behavior normal.           Cardiology studies reviewed: EKG from 8/20/2024 reviewed          Assessment and Plan     Diagnoses and all orders for this visit:    1. Paroxysmal SVT (supraventricular tachycardia) (Primary)  Assessment & Plan:  Currently stable on metoprolol.  EKG from August 2024 was unremarkable.  She denies any current cardiac symptoms.    - Continue metoprolol tartrate 50 mg twice daily    Orders:  -     metoprolol tartrate (LOPRESSOR) 50 MG tablet; Take 1 tablet by mouth 2 (Two) Times a Day.  Dispense: 180 tablet; Refill: 3    2. ERIC (obstructive sleep apnea)  Assessment & Plan:  History of moderate ERIC with AHI of 24.  She was intolerant to PAP therapy.  She is not willing to retry at this time.  We discussed risk benefits and importance of PAP therapy.  She declines to retry at this time.  -Do not drive if sleepy          Recommendations: ER if symptoms increase and Report if any new/changing symptoms immediately          Follow Up  Return in about 1 year (around 12/2/2025) for SVT.  Patient was given instructions and counseling regarding her condition or for health maintenance advice. Please see specific information pulled into the AVS if appropriate.

## 2025-03-21 ENCOUNTER — TELEPHONE (OUTPATIENT)
Dept: FAMILY MEDICINE CLINIC | Facility: CLINIC | Age: 55
End: 2025-03-21
Payer: COMMERCIAL

## 2025-03-21 NOTE — TELEPHONE ENCOUNTER
I have spoke with her and have let her know that she will need an appt. She has made this for 4/01/2025. TF

## 2025-03-21 NOTE — TELEPHONE ENCOUNTER
Caller: Bety Shah    Relationship: Self    Best call back number: [phone]     What medication are you requesting: MOUNJARO     What are your current symptoms: WEIGHT LOSS ISSUES     Have you had these symptoms before:    [x] Yes  [] No    Have you been treated for these symptoms before:   [x] Yes  [] No    If a prescription is needed, what is your preferred pharmacy and phone number:      [address] [phone] [facility] [phone]  [phone]       Additional notes:  PATIENT STATED THAT SHE SPOKE WITH EFREN HOPSON MD ABOUT GETTING STARTED ON A WEIGHT LOSS MEDICATION AT HER LAST OFFICE VISIT 08/27/2024 AND PATIENT WOULD LIKE TO START ON MOUNJARO

## 2025-04-01 ENCOUNTER — OFFICE VISIT (OUTPATIENT)
Dept: FAMILY MEDICINE CLINIC | Facility: CLINIC | Age: 55
End: 2025-04-01
Payer: COMMERCIAL

## 2025-04-01 VITALS
BODY MASS INDEX: 45.23 KG/M2 | OXYGEN SATURATION: 98 % | SYSTOLIC BLOOD PRESSURE: 118 MMHG | TEMPERATURE: 98.1 F | WEIGHT: 230.4 LBS | DIASTOLIC BLOOD PRESSURE: 80 MMHG | HEART RATE: 64 BPM | HEIGHT: 60 IN

## 2025-04-01 DIAGNOSIS — I10 PRIMARY HYPERTENSION: Primary | ICD-10-CM

## 2025-04-01 DIAGNOSIS — I47.10 PAROXYSMAL SVT (SUPRAVENTRICULAR TACHYCARDIA): ICD-10-CM

## 2025-04-01 DIAGNOSIS — G47.33 OSA (OBSTRUCTIVE SLEEP APNEA): ICD-10-CM

## 2025-04-01 DIAGNOSIS — R92.8 ABNORMAL MAMMOGRAM OF LEFT BREAST: ICD-10-CM

## 2025-04-01 DIAGNOSIS — E66.01 MORBID (SEVERE) OBESITY DUE TO EXCESS CALORIES: ICD-10-CM

## 2025-04-01 NOTE — ASSESSMENT & PLAN NOTE
Benign left breast biopsy on 8/18/2024, original screening mammogram on 8/27/2024, due for 6-month follow-up diagnostic mammogram.  Referral given

## 2025-04-01 NOTE — ASSESSMENT & PLAN NOTE
Longstanding struggle with obesity having been prescribed phentermine 15 mg daily in 8/2024 fortunately having no benefit regarding appetite suppression.  Did not follow-up subsequently given misunderstanding.  She is interested in GLP-1 agonist but unfortunately her insurance company will only cover this product if she has a concomitant separate diagnosis.  She does have a history of SVT and hypertension which may qualify her to take Mounjaro as detailed above.  If this is not a covered product ultimately, then we will switch her back to the higher dose of phentermine 37.5 mg daily with a follow-up in 1 month, after which we will then discuss addition of Topamax depending on her clinical response.

## 2025-04-01 NOTE — ASSESSMENT & PLAN NOTE
Continues to maintain satisfactory control acutely as well as by history chronically taking metoprolol 50 mg twice daily, on this regimen given concomitant diagnosis of PSVT.  Continue current regimen with monitoring.  EKG in 8/2024 was normal.No recent of SVT take metoprolol 50 mg twice daily.  EKG from 8/2024 was unremarkable.  Continue current regimen, also discussing potential benefit taking Mounjaro 2.5 mg subcu weekly given her hypertension and potential cardiovascular benefits, to be prescribed after discussing mechanism of action, potential side effects, as well as contraindications.  Follow-up in 1 month for review.

## 2025-04-01 NOTE — PROGRESS NOTES
Follow Up Office Visit      Date: 2025   Patient Name: Bety Shah  : 1970   MRN: [id number]     Chief Complaint:    Chief Complaint   Patient presents with    wants mounjaro rx       History of Present Illness: Bety Shah is a 54 y.o. female who is here today for evaluation of her obesity.  Last seen in the office on 2020 for having been given a prescription of phentermine 15 mg daily for her obesity.  She did not note any benefit regarding appetite suppression of the medication, and given lack of follow-up misunderstanding, this was not refilled.  She is not interested in further assistance with weight loss.  Admittedly she does only limited fruits and vegetables eating more meat and rice of some salads, excessive junk food and fast food, has cut out soda intake, walks about twice weekly.  No family history of medullary thyroid cancer or personal history of acute pancreatitis.  Does have a history of hypertension and SVT with a history of normal left heart catheterization in 2020, taking metoprolol for both problems, no recent SVT episodes, blood pressures with satisfactory control, and ERIC intolerant of.  Had an abnormal left breast screening mammogram in 2024 ultimately culminating in a benign breast biopsy on 2024 by Dr. Amado Sarabia.  Due for 6-month follow-up surveillance diagnostic mammogram.  Also has had a gynecological evaluation including Pap smear that she reports was normal from provider in [place].  She has been unable to afford for now recommended colonoscopy, noting she has at higher risk given maternal family history of colon cancer, indicating she will obtain a colonoscopy next year.    Subjective      Review of Systems:   Review of Systems    I have reviewed the patients family history, social history, past medical history, past surgical history and have updated it as appropriate.     Medications:     Current Outpatient Medications:     albuterol  "sulfate  (90 Base) MCG/ACT inhaler, Inhale 2 puffs Every 4 (Four) Hours As Needed., Disp: , Rfl: 0    Diclofenac Sodium (VOLTAREN) 1 % gel gel, APPLY 4 GRAMS OF GEL TOPICALLY TO AFFECTED AREA UP TO 4 TIMES DAILY, Disp: , Rfl:     meclizine (ANTIVERT) 25 MG tablet, Take 1 tablet by mouth 3 (Three) Times a Day As Needed for Dizziness or Nausea., Disp: 30 tablet, Rfl: 0    metoprolol tartrate (LOPRESSOR) 50 MG tablet, Take 1 tablet by mouth 2 (Two) Times a Day., Disp: 180 tablet, Rfl: 3    pantoprazole (Protonix) 40 MG EC tablet, Take 1 tablet by mouth Daily., Disp: 90 tablet, Rfl: 3    ondansetron (ZOFRAN) 8 MG tablet, Take 1 tablet by mouth Every 8 (Eight) Hours As Needed for Nausea or Vomiting. (Patient not taking: Reported on 4/1/2025), Disp: 12 tablet, Rfl: 0    Tirzepatide 2.5 MG/0.5ML solution auto-injector, Inject 2.5 mg under the skin into the appropriate area as directed Every 7 (Seven) Days., Disp: 2 mL, Rfl: 0    Allergies:   No Known Allergies    Objective     Physical Exam: Please see above  Vital Signs:   Vitals:    04/01/25 1132   BP: 118/80   BP Location: Left arm   Patient Position: Sitting   Cuff Size: Adult   Pulse: 64   Temp: 98.1 °F (36.7 °C)   TempSrc: Temporal   SpO2: 98%   Weight: 105 kg (230 lb 6.4 oz)   Height: 152.4 cm (60\")     Body mass index is 45 kg/m².          Physical Exam  Constitutional:       General: She is not in acute distress.     Appearance: Normal appearance. She is obese. She is not ill-appearing.      Comments: Pleasant, healthy, NAD, BMI 45.0 reflecting a 5 pound weight gain in the last 4 months   Cardiovascular:      Rate and Rhythm: Normal rate and regular rhythm.      Heart sounds: Normal heart sounds. No murmur heard.     No friction rub. No gallop.   Pulmonary:      Effort: Pulmonary effort is normal. No respiratory distress.      Breath sounds: Normal breath sounds.   Musculoskeletal:      Cervical back: No rigidity or tenderness.   Lymphadenopathy:      " "Cervical: No cervical adenopathy.   Neurological:      General: No focal deficit present.      Mental Status: She is alert.   Psychiatric:         Mood and Affect: Mood normal.         Procedures    Results:   Labs:   No results found for: \"HGBA1C\", \"CMP\", \"CBCDIFFPANEL\", \"CREAT\", \"TSH\"     POCT Results (if applicable):   No results found for this or any previous visit.    Imaging:   No valid procedures specified.       Assessment / Plan      Assessment/Plan:   Diagnoses and all orders for this visit:    1. Primary hypertension (Primary)  Assessment & Plan:  Continues to maintain satisfactory control acutely as well as by history chronically taking metoprolol 50 mg twice daily, on this regimen given concomitant diagnosis of PSVT.  Continue current regimen with monitoring.  EKG in 8/2024 was normal.No recent of SVT take metoprolol 50 mg twice daily.  EKG from 8/2024 was unremarkable.  Continue current regimen, also discussing potential benefit taking Mounjaro 2.5 mg subcu weekly given her hypertension and potential cardiovascular benefits, to be prescribed after discussing mechanism of action, potential side effects, as well as contraindications.  Follow-up in 1 month for review.    Orders:  -     Tirzepatide 2.5 MG/0.5ML solution auto-injector; Inject 2.5 mg under the skin into the appropriate area as directed Every 7 (Seven) Days.  Dispense: 2 mL; Refill: 0    2. Paroxysmal SVT (supraventricular tachycardia)  Assessment & Plan:  No recent of SVT take metoprolol 50 mg twice daily.  EKG from 8/2024 was unremarkable.  Continue current regimen, also discussing potential benefit taking Mounjaro 2.5 mg subcu weekly given her cardiac condition, to be prescribed after discussing mechanism of action, potential side effects, as well as contraindications.  Follow-up in 1 month for review.    Orders:  -     Tirzepatide 2.5 MG/0.5ML solution auto-injector; Inject 2.5 mg under the skin into the appropriate area as directed Every " 7 (Seven) Days.  Dispense: 2 mL; Refill: 0    3. Abnormal mammogram of left breast  Assessment & Plan:  Benign left breast biopsy on 8/18/2024, original screening mammogram on 8/27/2024, due for 6-month follow-up diagnostic mammogram.  Referral given    Orders:  -     Mammo Diagnostic Digital Tomosynthesis Left With CAD; Future    4. ERIC (obstructive sleep apnea)  Assessment & Plan:  Documented moderate ERIC with AHI of 24, intolerant to PAP therapy.  Followed by Dr. Yessica Johnson of cardiology/sleep medicine      5. Morbid (severe) obesity due to excess calories  Assessment & Plan:  Longstanding struggle with obesity having been prescribed phentermine 15 mg daily in 8/2024 fortunately having no benefit regarding appetite suppression.  Did not follow-up subsequently given misunderstanding.  She is interested in GLP-1 agonist but unfortunately her insurance company will only cover this product if she has a concomitant separate diagnosis.  She does have a history of SVT and hypertension which may qualify her to take Mounjaro as detailed above.  If this is not a covered product ultimately, then we will switch her back to the higher dose of phentermine 37.5 mg daily with a follow-up in 1 month, after which we will then discuss addition of Topamax depending on her clinical response.          Follow Up:   Return in about 1 month (around 5/1/2025) for Recheck.      At [facility], we believe that sharing information builds trust and better relationships. You are receiving this note because you recently visited [facility]. It is possible you will see health information before a provider has talked with you about it. This kind of information can be easy to misunderstand. To help you fully understand what it means for your health, we urge you to discuss this note with your provider.    Adryan Henry MD  [facility]

## 2025-04-01 NOTE — ASSESSMENT & PLAN NOTE
No recent of SVT take metoprolol 50 mg twice daily.  EKG from 8/2024 was unremarkable.  Continue current regimen, also discussing potential benefit taking Mounjaro 2.5 mg subcu weekly given her cardiac condition, to be prescribed after discussing mechanism of action, potential side effects, as well as contraindications.  Follow-up in 1 month for review.

## 2025-04-01 NOTE — ASSESSMENT & PLAN NOTE
Documented moderate ERIC with AHI of 24, intolerant to PAP therapy.  Followed by Dr. Yessica Johnson of cardiology/sleep medicine

## 2025-04-02 ENCOUNTER — TELEPHONE (OUTPATIENT)
Dept: FAMILY MEDICINE CLINIC | Facility: CLINIC | Age: 55
End: 2025-04-02
Payer: COMMERCIAL

## 2025-04-02 NOTE — TELEPHONE ENCOUNTER
PA for mounjaro has been done and office note from 4/1/2025 sent with this. I have left a vm letting her know this. TF

## 2025-04-03 ENCOUNTER — TELEPHONE (OUTPATIENT)
Dept: FAMILY MEDICINE CLINIC | Facility: CLINIC | Age: 55
End: 2025-04-03
Payer: COMMERCIAL

## 2025-04-03 DIAGNOSIS — E66.01 MORBID (SEVERE) OBESITY DUE TO EXCESS CALORIES: Primary | ICD-10-CM

## 2025-04-03 RX ORDER — PHENTERMINE HYDROCHLORIDE 15 MG/1
15 CAPSULE ORAL EVERY MORNING
Qty: 30 CAPSULE | Refills: 0 | Status: SHIPPED | OUTPATIENT
Start: 2025-04-03

## 2025-04-03 NOTE — TELEPHONE ENCOUNTER
Caller: AlyssaBety newton    Relationship: Self    Best call back number: [phone]     What medication are you requesting: PHENTERMINE    Have you had these symptoms before:    [x] Yes  [] No    Have you been treated for these symptoms before:   [x] Yes  [] No    If a prescription is needed, what is your preferred pharmacy and phone number: [address] [phone] [facility] [phone]      Additional notes: PATIENT STATES THAT HER INSURANCE DENIED HER PRIOR AUTHORIZATION FOR ZEPBOUND, AND SHE WAS TOLD TO REQUEST PHENTERMINE FROM DR HOPSON IN THE CASE THAT THAT HAPPENED    PLEASE ADVISE

## 2025-04-03 NOTE — TELEPHONE ENCOUNTER
Her insurance denied the zepbound. She would like to take the weight loss pill. Would like for this to be called in to [facility]. TF      I have left her a vm letting her know that she will need a follow up in one month. TF

## 2025-04-09 DIAGNOSIS — E66.01 MORBID (SEVERE) OBESITY DUE TO EXCESS CALORIES: Primary | ICD-10-CM

## 2025-04-09 RX ORDER — PHENTERMINE HYDROCHLORIDE 37.5 MG/1
37.5 CAPSULE ORAL EVERY MORNING
Qty: 30 CAPSULE | Refills: 0 | Status: SHIPPED | OUTPATIENT
Start: 2025-04-09

## 2025-05-07 ENCOUNTER — OFFICE VISIT (OUTPATIENT)
Dept: FAMILY MEDICINE CLINIC | Facility: CLINIC | Age: 55
End: 2025-05-07
Payer: COMMERCIAL

## 2025-05-07 VITALS
WEIGHT: 219.8 LBS | OXYGEN SATURATION: 96 % | HEART RATE: 82 BPM | TEMPERATURE: 97.8 F | HEIGHT: 60 IN | DIASTOLIC BLOOD PRESSURE: 86 MMHG | BODY MASS INDEX: 43.15 KG/M2 | SYSTOLIC BLOOD PRESSURE: 129 MMHG

## 2025-05-07 DIAGNOSIS — I10 PRIMARY HYPERTENSION: ICD-10-CM

## 2025-05-07 DIAGNOSIS — R92.8 ABNORMAL MAMMOGRAM OF LEFT BREAST: ICD-10-CM

## 2025-05-07 DIAGNOSIS — E66.01 MORBID (SEVERE) OBESITY DUE TO EXCESS CALORIES: Primary | ICD-10-CM

## 2025-05-07 DIAGNOSIS — I47.10 PAROXYSMAL SVT (SUPRAVENTRICULAR TACHYCARDIA): ICD-10-CM

## 2025-05-07 RX ORDER — PHENTERMINE HYDROCHLORIDE 37.5 MG/1
37.5 CAPSULE ORAL EVERY MORNING
Qty: 30 CAPSULE | Refills: 0 | Status: SHIPPED | OUTPATIENT
Start: 2025-05-07

## 2025-05-07 NOTE — PROGRESS NOTES
Answers submitted by the patient for this visit:  Weight Management (Submitted on 2025)  Chief Complaint: Weight Management  Weight: decreased  Weight change (lbs): 10  Weight loss treatment: portion control, prescription medications  Eating habit changes: Reduced appetite  Energy level: unchanged  Physical activity tolerance: improved  Treatment barriers: none      Follow Up Office Visit      Date: 2025   Patient Name: Bety hSah  : 1970   MRN: [id number]     Chief Complaint:    Chief Complaint   Patient presents with    follow up BP and weight       History of Present Illness: Bety Shah is a 54 y.o. female who is here today for 1 month follow-up following initiation of phentermine 37.5 mg daily, this prescribed after prescribed Mounjaro was not covered by her insurance.  She does note phentermine significant reduces her appetite with no significant side effects other than occasionally she have some shakiness if she takes the medication too early in the morning.  She gets good appetite suppression for the majority of the day.  Secondarily having portion size, and also has been making dietary changes with reduction in carbohydrate intake, and also starting to walk a couple times weekly.  Patient also has a history of hypertension with SVT, taking metoprolol 50 mg twice daily, blood pressures averaging 120/80, and not noting any palpitations.  No chest pains or dyspnea.  No edema.  Also has a history of left breast biopsy in 2020 for by Dr. Sarabia that fortunately was negative for breast cancer.  Patient subsequently was given a referral for recommended 6-month left diagnostic mammogram, which patient has not yet obtained given unable to afford financially at this time.  No other acute problems.    Subjective      Review of Systems:   Review of Systems    I have reviewed the patients family history, social history, past medical history, past surgical history and have updated it  "as appropriate.     Medications:     Current Outpatient Medications:     albuterol sulfate  (90 Base) MCG/ACT inhaler, Inhale 2 puffs Every 4 (Four) Hours As Needed., Disp: , Rfl: 0    Diclofenac Sodium (VOLTAREN) 1 % gel gel, APPLY 4 GRAMS OF GEL TOPICALLY TO AFFECTED AREA UP TO 4 TIMES DAILY, Disp: , Rfl:     meclizine (ANTIVERT) 25 MG tablet, Take 1 tablet by mouth 3 (Three) Times a Day As Needed for Dizziness or Nausea., Disp: 30 tablet, Rfl: 0    metoprolol tartrate (LOPRESSOR) 50 MG tablet, Take 1 tablet by mouth 2 (Two) Times a Day., Disp: 180 tablet, Rfl: 3    ondansetron (ZOFRAN) 8 MG tablet, Take 1 tablet by mouth Every 8 (Eight) Hours As Needed for Nausea or Vomiting., Disp: 12 tablet, Rfl: 0    pantoprazole (Protonix) 40 MG EC tablet, Take 1 tablet by mouth Daily., Disp: 90 tablet, Rfl: 3    phentermine 37.5 MG capsule, Take 1 capsule by mouth Every Morning., Disp: 30 capsule, Rfl: 0    Allergies:   No Known Allergies    Objective     Physical Exam: Please see above  Vital Signs:   Vitals:    05/07/25 0850   BP: 129/86   BP Location: Left arm   Patient Position: Sitting   Cuff Size: Adult   Pulse: 82   Temp: 97.8 °F (36.6 °C)   TempSrc: Temporal   SpO2: 96%   Weight: 99.7 kg (219 lb 12.8 oz)   Height: 152.4 cm (60\")     Body mass index is 42.93 kg/m².          Physical Exam  Constitutional:       Appearance: Normal appearance. She is obese.      Comments: Pleasant, healthy, NAD, BMI 42.9 reflecting an 11 pound weight loss in the last month   Cardiovascular:      Rate and Rhythm: Normal rate and regular rhythm.      Heart sounds: Normal heart sounds. No murmur heard.     No friction rub. No gallop.   Pulmonary:      Effort: Pulmonary effort is normal. No respiratory distress.      Breath sounds: Normal breath sounds.   Musculoskeletal:      Right lower leg: No edema.      Left lower leg: No edema.   Neurological:      General: No focal deficit present.      Mental Status: She is alert. Mental " "status is at baseline.   Psychiatric:         Mood and Affect: Mood normal.         Procedures    Results:   Labs:   No results found for: \"HGBA1C\", \"CMP\", \"CBCDIFFPANEL\", \"CREAT\", \"TSH\"     POCT Results (if applicable):   No results found for this or any previous visit.    Imaging:   No valid procedures specified.       Assessment / Plan      Assessment/Plan:   Diagnoses and all orders for this visit:    1. Morbid (severe) obesity due to excess calories (Primary)  Assessment & Plan:  Longstanding struggles with obesity, patient unfortunately unable to afford prescribed Mounjaro given lack of insurance coverage (prescribed given diagnosis of SVT).  Subsequently started 1 month ago on phentermine 37.5 mg daily, given she did not respond previously to phentermine 15 mg daily prescription.  Has had a nice 11 pound weight loss in the last month, noting good appetite suppression with subsequent portion control, and also has made specifically dietary changes of reducing her carbohydrate intake.  Has been walking during the lunch hour twice weekly, encouraged to increase to 4-5 times weekly.  Plan continue phentermine 37.5 mg daily, reassessing in another month.  At some point when she starts to level off on her weight loss benefits, we will then discuss addition of Topamax at that time.    Orders:  -     phentermine 37.5 MG capsule; Take 1 capsule by mouth Every Morning.  Dispense: 30 capsule; Refill: 0    2. Primary hypertension  Assessment & Plan:  Continues very satisfactory blood pressure control acutely in the office as well as by history chronically taking metoprolol 50 mg twice daily, on this regimen given concomitant diagnosis of PSVT.  Continue current regimen with monitoring.  EKG in 8/2024 was normal. No recent of SVT take metoprolol 50 mg twice daily.  Continue current regimen, with regular BP monitoring, reassessing clinically in another month.      3. Paroxysmal SVT (supraventricular tachycardia)  Assessment " & Plan:  No subjective palpitations of late to suggest any episodes of SVT, currently take metoprolol 50 mg twice daily for suppression of tachyarrhythmia and blood pressure control.      4. Abnormal mammogram of left breast  Assessment & Plan:  Benign left breast biopsy on 8/18/2024, original screening mammogram on 8/27/2024, due for 6-month follow-up diagnostic mammogram with abdomen but given last month but not pursued by patient given financial stressors.  Advised to do so once financially able.          Vaccine Counseling:      Follow Up:   Return in about 1 month (around 6/7/2025) for Recheck.      At [facility], we believe that sharing information builds trust and better relationships. You are receiving this note because you recently visited [facility]. It is possible you will see health information before a provider has talked with you about it. This kind of information can be easy to misunderstand. To help you fully understand what it means for your health, we urge you to discuss this note with your provider.    Adryan Henry MD  [facility] [place] [facility]

## 2025-05-07 NOTE — ASSESSMENT & PLAN NOTE
No subjective palpitations of late to suggest any episodes of SVT, currently take metoprolol 50 mg twice daily for suppression of tachyarrhythmia and blood pressure control.

## 2025-05-07 NOTE — ASSESSMENT & PLAN NOTE
Benign left breast biopsy on 8/18/2024, original screening mammogram on 8/27/2024, due for 6-month follow-up diagnostic mammogram with abdomen but given last month but not pursued by patient given financial stressors.  Advised to do so once financially able.

## 2025-05-07 NOTE — ASSESSMENT & PLAN NOTE
Longstanding struggles with obesity, patient unfortunately unable to afford prescribed Mounjaro given lack of insurance coverage (prescribed given diagnosis of SVT).  Subsequently started 1 month ago on phentermine 37.5 mg daily, given she did not respond previously to phentermine 15 mg daily prescription.  Has had a nice 11 pound weight loss in the last month, noting good appetite suppression with subsequent portion control, and also has made specifically dietary changes of reducing her carbohydrate intake.  Has been walking during the lunch hour twice weekly, encouraged to increase to 4-5 times weekly.  Plan continue phentermine 37.5 mg daily, reassessing in another month.  At some point when she starts to level off on her weight loss benefits, we will then discuss addition of Topamax at that time.

## 2025-05-07 NOTE — ASSESSMENT & PLAN NOTE
Continues very satisfactory blood pressure control acutely in the office as well as by history chronically taking metoprolol 50 mg twice daily, on this regimen given concomitant diagnosis of PSVT.  Continue current regimen with monitoring.  EKG in 8/2024 was normal. No recent of SVT take metoprolol 50 mg twice daily.  Continue current regimen, with regular BP monitoring, reassessing clinically in another month.

## 2025-06-09 ENCOUNTER — OFFICE VISIT (OUTPATIENT)
Dept: FAMILY MEDICINE CLINIC | Facility: CLINIC | Age: 55
End: 2025-06-09
Payer: COMMERCIAL

## 2025-06-09 VITALS
OXYGEN SATURATION: 97 % | BODY MASS INDEX: 42.41 KG/M2 | WEIGHT: 216 LBS | HEART RATE: 53 BPM | DIASTOLIC BLOOD PRESSURE: 86 MMHG | HEIGHT: 60 IN | SYSTOLIC BLOOD PRESSURE: 128 MMHG | TEMPERATURE: 97.6 F

## 2025-06-09 DIAGNOSIS — B00.2 RECURRENT ORAL HERPES SIMPLEX: ICD-10-CM

## 2025-06-09 DIAGNOSIS — E66.01 MORBID (SEVERE) OBESITY DUE TO EXCESS CALORIES: Primary | ICD-10-CM

## 2025-06-09 RX ORDER — TOPIRAMATE 25 MG/1
25 TABLET, FILM COATED ORAL 2 TIMES DAILY
Qty: 30 TABLET | Refills: 2 | Status: SHIPPED | OUTPATIENT
Start: 2025-06-09

## 2025-06-09 RX ORDER — VALACYCLOVIR HYDROCHLORIDE 1 G/1
TABLET, FILM COATED ORAL
Qty: 4 TABLET | Refills: 5 | Status: SHIPPED | OUTPATIENT
Start: 2025-06-09

## 2025-06-09 RX ORDER — PHENTERMINE HYDROCHLORIDE 37.5 MG/1
37.5 CAPSULE ORAL EVERY MORNING
Qty: 30 CAPSULE | Refills: 2 | Status: SHIPPED | OUTPATIENT
Start: 2025-06-09

## 2025-06-09 NOTE — PROGRESS NOTES
Follow Up Office Visit      Date: 2025   Patient Name: Bety Shah  : 1970   MRN: [id number]     Chief Complaint:    Chief Complaint   Patient presents with    Weight Management       History of Present Illness: Bety Shah is a 54 y.o. female who is here today for follow-up of her obesity for which she has been taking phentermine 37.5 mg daily for the last month, and prior to that phentermine 15 mg daily, having had a 3 pound weight loss in the last month and a 14 pound weight loss over the last 2 months.  She does note the phentermine anorectic effect seems to be wearing off towards the middle part of the day, previously having had a longer duration, fortunately having no side effects of the medication.  She in general is eating healthy foods with fruits, limited junk foods and fast foods, though still not a lot of vegetables.  Primarily drinks water.  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  Also notes recurrent cold sores for many years now, typically every 2 months.  Most recent sore present for 1 week and gradually healing.  Notes she generally gets a flare every 2 months or so.  She also has hypertension SVT history for which she takes metoprolol 50 mg twice daily, indicating no chest pains palpitations dizziness dyspnea or edema, blood pressures occasionally checked averaging 1930 over low 80s..    Subjective      Review of Systems:   Review of Systems    I have reviewed the patients family history, social history, past medical history, past surgical history and have updated it as appropriate.     Medications:     Current Outpatient Medications:     albuterol sulfate  (90 Base) MCG/ACT inhaler, Inhale 2 puffs Every 4 (Four) Hours As Needed., Disp: , Rfl: 0    Diclofenac Sodium (VOLTAREN) 1 % gel gel, APPLY 4 GRAMS OF GEL TOPICALLY TO AFFECTED AREA UP TO 4 TIMES DAILY, Disp: , Rfl:     meclizine (ANTIVERT) 25 MG tablet, Take 1 tablet by  "mouth 3 (Three) Times a Day As Needed for Dizziness or Nausea., Disp: 30 tablet, Rfl: 0    metoprolol tartrate (LOPRESSOR) 50 MG tablet, Take 1 tablet by mouth 2 (Two) Times a Day., Disp: 180 tablet, Rfl: 3    ondansetron (ZOFRAN) 8 MG tablet, Take 1 tablet by mouth Every 8 (Eight) Hours As Needed for Nausea or Vomiting., Disp: 12 tablet, Rfl: 0    pantoprazole (Protonix) 40 MG EC tablet, Take 1 tablet by mouth Daily., Disp: 90 tablet, Rfl: 3    phentermine 37.5 MG capsule, Take 1 capsule by mouth Every Morning., Disp: 30 capsule, Rfl: 2    topiramate (Topamax) 25 MG tablet, Take 1 tablet by mouth 2 (Two) Times a Day., Disp: 30 tablet, Rfl: 2    valACYclovir (Valtrex) 1000 MG tablet, 2 tablets at onset of cold sore, repeat same dose in 12 hours, Disp: 4 tablet, Rfl: 5    Allergies:   No Known Allergies    Objective     Physical Exam: Please see above  Vital Signs:   Vitals:    06/09/25 0949   BP: 128/86   BP Location: Left arm   Patient Position: Sitting   Cuff Size: Adult   Pulse: 53   Temp: 97.6 °F (36.4 °C)   TempSrc: Temporal   SpO2: 97%   Weight: 98 kg (216 lb)   Height: 152.4 cm (60\")   PainSc: 0-No pain     Body mass index is 42.18 kg/m².          Physical Exam  Constitutional:       General: She is not in acute distress.     Appearance: Normal appearance. She is obese. She is not ill-appearing.      Comments: Pleasant, healthy, NAD, BMI 42.1 representing a 3 pound weight loss in the last month and a 14 pound weight loss in the last 2 months   HENT:      Mouth/Throat:      Comments: Crusting cold sore in the mid lower lip consistent with a viral herpetic lesion resolving  Cardiovascular:      Rate and Rhythm: Normal rate and regular rhythm.      Heart sounds: No murmur heard.     No friction rub. No gallop.   Pulmonary:      Effort: Pulmonary effort is normal. No respiratory distress.      Breath sounds: Normal breath sounds.   Neurological:      General: No focal deficit present.      Mental Status: She is " "alert.   Psychiatric:         Mood and Affect: Mood normal.         Procedures    Results:   Labs:   No results found for: \"HGBA1C\", \"CMP\", \"CBCDIFFPANEL\", \"CREAT\", \"TSH\"     POCT Results (if applicable):   No results found for this or any previous visit.    Imaging:   No valid procedures specified.     Assessment / Plan      Assessment/Plan:   Diagnoses and all orders for this visit:    1. Morbid (severe) obesity due to excess calories (Primary)  Assessment & Plan:  Longstanding struggle with obesity, unable to afford Mounjaro previously prescribed, most recent prescription of phentermine 37.5 mg daily in early 4/2025 (note previous phentermine 15 mg daily prescription have been given in 8/2024 with no clinical response), initially having had an 11 pound weight loss over the first month but only 3 pound weight loss in the subsequent last month.  Plan add Topamax 25 mg daily to the phentermine 37.5 mg daily, discussing side effect profile potential, pursuing healthy lifestyle efforts with diet and exercise, and planning for a follow-up visit in 3 months.  Advise if problems or concerns in the interim.    Orders:  -     phentermine 37.5 MG capsule; Take 1 capsule by mouth Every Morning.  Dispense: 30 capsule; Refill: 2  -     topiramate (Topamax) 25 MG tablet; Take 1 tablet by mouth 2 (Two) Times a Day.  Dispense: 30 tablet; Refill: 2    2. Recurrent oral herpes simplex  Assessment & Plan:  Patient relates for several years now recurring episodes of oral herpes labialis, typically every several months, most recent episode present for 1 week and now to starting to heal.  Discussed pros and cons of prophylaxis versus acute therapy, and for now we will treat acutely with Valtrex as needed, patient advised to start the medication as soon as she knows she is developing a cold sore    Orders:  -     valACYclovir (Valtrex) 1000 MG tablet; 2 tablets at onset of cold sore, repeat same dose in 12 hours  Dispense: 4 tablet; " Refill: 5        Vaccine Counseling:      Follow Up:   Return in about 3 months (around 9/9/2025) for Recheck.      At [facility], we believe that sharing information builds trust and better relationships. You are receiving this note because you recently visited [facility]. It is possible you will see health information before a provider has talked with you about it. This kind of information can be easy to misunderstand. To help you fully understand what it means for your health, we urge you to discuss this note with your provider.    Adryan Henry MD  [facility] [place] [facility] [place]

## 2025-06-09 NOTE — ASSESSMENT & PLAN NOTE
Longstanding struggle with obesity, unable to afford Mounjaro previously prescribed, most recent prescription of phentermine 37.5 mg daily in early 4/2025 (note previous phentermine 15 mg daily prescription have been given in 8/2024 with no clinical response), initially having had an 11 pound weight loss over the first month but only 3 pound weight loss in the subsequent last month.  Plan add Topamax 25 mg daily to the phentermine 37.5 mg daily, discussing side effect profile potential, pursuing healthy lifestyle efforts with diet and exercise, and planning for a follow-up visit in 3 months.  Advise if problems or concerns in the interim.

## 2025-06-09 NOTE — ASSESSMENT & PLAN NOTE
Patient relates for several years now recurring episodes of oral herpes labialis, typically every several months, most recent episode present for 1 week and now to starting to heal.  Discussed pros and cons of prophylaxis versus acute therapy, and for now we will treat acutely with Valtrex as needed, patient advised to start the medication as soon as she knows she is developing a cold sore